# Patient Record
Sex: FEMALE | Race: WHITE | NOT HISPANIC OR LATINO | Employment: FULL TIME | URBAN - METROPOLITAN AREA
[De-identification: names, ages, dates, MRNs, and addresses within clinical notes are randomized per-mention and may not be internally consistent; named-entity substitution may affect disease eponyms.]

---

## 2020-01-08 ENCOUNTER — OFFICE VISIT (OUTPATIENT)
Dept: OPTOMETRY | Facility: CLINIC | Age: 30
End: 2020-01-08
Payer: COMMERCIAL

## 2020-01-08 DIAGNOSIS — Z01.00 EXAMINATION OF EYES AND VISION: Primary | ICD-10-CM

## 2020-01-08 DIAGNOSIS — Z46.0 CONTACT LENS/GLASSES FITTING: Primary | ICD-10-CM

## 2020-01-08 DIAGNOSIS — H52.13 MYOPIA, BILATERAL: ICD-10-CM

## 2020-01-08 DIAGNOSIS — Z46.0 CONTACT LENS/GLASSES FITTING: ICD-10-CM

## 2020-01-08 PROCEDURE — 92310 PR CONTACT LENS FITTING (NO CHANGE): ICD-10-PCS | Mod: CSM,,, | Performed by: OPTOMETRIST

## 2020-01-08 PROCEDURE — 92015 DETERMINE REFRACTIVE STATE: CPT | Mod: S$GLB,,, | Performed by: OPTOMETRIST

## 2020-01-08 PROCEDURE — 92004 COMPRE OPH EXAM NEW PT 1/>: CPT | Mod: S$GLB,,, | Performed by: OPTOMETRIST

## 2020-01-08 PROCEDURE — 99999 PR PBB SHADOW E&M-EST. PATIENT-LVL II: ICD-10-PCS | Mod: PBBFAC,,, | Performed by: OPTOMETRIST

## 2020-01-08 PROCEDURE — 99499 UNLISTED E&M SERVICE: CPT | Mod: S$GLB,,, | Performed by: OPTOMETRIST

## 2020-01-08 PROCEDURE — 92015 PR REFRACTION: ICD-10-PCS | Mod: S$GLB,,, | Performed by: OPTOMETRIST

## 2020-01-08 PROCEDURE — 99999 PR PBB SHADOW E&M-EST. PATIENT-LVL II: CPT | Mod: PBBFAC,,, | Performed by: OPTOMETRIST

## 2020-01-08 PROCEDURE — 92004 PR EYE EXAM, NEW PATIENT,COMPREHESV: ICD-10-PCS | Mod: S$GLB,,, | Performed by: OPTOMETRIST

## 2020-01-08 PROCEDURE — 92310 CONTACT LENS FITTING OU: CPT | Mod: CSM,,, | Performed by: OPTOMETRIST

## 2020-01-08 PROCEDURE — 99499 NO LOS: ICD-10-PCS | Mod: S$GLB,,, | Performed by: OPTOMETRIST

## 2020-01-08 NOTE — PROGRESS NOTES
HPI     Routine eye exam with contacts-dle-10/9/18    Pt denies any blurred vision. Complains of trouble seeing at night. Denies   any eye pain. No flashes or floaters.     Last edited by Willa Strauss on 1/8/2020  9:21 AM. (History)        ROS     Positive for: Eyes    Negative for: Constitutional, Gastrointestinal, Neurological, Skin,   Genitourinary, Musculoskeletal, HENT, Endocrine, Cardiovascular,   Respiratory, Psychiatric, Allergic/Imm, Heme/Lymph    Last edited by GUILLE Toribio, OD on 1/8/2020  9:31 AM. (History)        Assessment /Plan     For exam results, see Encounter Report.    Examination of eyes and vision    Myopia, bilateral    Contact lens/glasses fitting      1. Ocular health exam OU   Defer DFE today due to work  2. Updated specs rx gave copy  3. Updated clrx, gave new trials with slight changes OS---optifree soln  Message if issues    DAILY WEAR CONTACT LENSES  Continue with Daily Wear of contact lenses, up to all waking hours.  Continue with approved contact lens disinfection / rewetting drops as discussed.  Dispose of lenses monthly.  Stop wearing your lenses and call our office if redness, blurred vision, or pain persists more than 12 hours.  We recommend an annual eye exam for contact lens patients.    Will return in 1 week to complete DFE    Discussed and educated patient on current findings /plan.  RTC 1 year, prn if any changes / issues

## 2020-04-21 DIAGNOSIS — Z01.84 ANTIBODY RESPONSE EXAMINATION: ICD-10-CM

## 2020-04-24 ENCOUNTER — LAB VISIT (OUTPATIENT)
Dept: LAB | Facility: HOSPITAL | Age: 30
End: 2020-04-24
Attending: INTERNAL MEDICINE
Payer: COMMERCIAL

## 2020-04-24 DIAGNOSIS — Z01.84 ANTIBODY RESPONSE EXAMINATION: ICD-10-CM

## 2020-04-24 LAB — SARS-COV-2 IGG SERPL QL IA: NEGATIVE

## 2020-04-24 PROCEDURE — 86769 SARS-COV-2 COVID-19 ANTIBODY: CPT

## 2020-04-24 PROCEDURE — 36415 COLL VENOUS BLD VENIPUNCTURE: CPT | Mod: PO

## 2020-05-12 ENCOUNTER — HOSPITAL ENCOUNTER (OUTPATIENT)
Dept: RADIOLOGY | Facility: HOSPITAL | Age: 30
Discharge: HOME | End: 2020-05-12
Attending: NURSE PRACTITIONER
Payer: COMMERCIAL

## 2020-05-12 ENCOUNTER — OFFICE VISIT (OUTPATIENT)
Dept: ORTHOPEDICS | Facility: CLINIC | Age: 30
End: 2020-05-12
Payer: COMMERCIAL

## 2020-05-12 DIAGNOSIS — M25.559 ARTHRALGIA OF HIP, UNSPECIFIED LATERALITY: Primary | ICD-10-CM

## 2020-05-12 DIAGNOSIS — M25.559 ARTHRALGIA OF HIP, UNSPECIFIED LATERALITY: ICD-10-CM

## 2020-05-12 DIAGNOSIS — M70.61 GREATER TROCHANTERIC BURSITIS OF RIGHT HIP: Primary | ICD-10-CM

## 2020-05-12 PROCEDURE — 73521 XR HIPS BILATERAL 2 VIEW INCL AP PELVIS: ICD-10-PCS | Mod: 26,,, | Performed by: RADIOLOGY

## 2020-05-12 PROCEDURE — 73521 X-RAY EXAM HIPS BI 2 VIEWS: CPT | Mod: 26,,, | Performed by: RADIOLOGY

## 2020-05-12 PROCEDURE — 99203 OFFICE O/P NEW LOW 30 MIN: CPT | Mod: S$GLB,,, | Performed by: NURSE PRACTITIONER

## 2020-05-12 PROCEDURE — 99999 PR PBB SHADOW E&M-EST. PATIENT-LVL II: ICD-10-PCS | Mod: PBBFAC,,, | Performed by: NURSE PRACTITIONER

## 2020-05-12 PROCEDURE — 73521 X-RAY EXAM HIPS BI 2 VIEWS: CPT | Mod: TC,PO

## 2020-05-12 PROCEDURE — 99203 PR OFFICE/OUTPT VISIT, NEW, LEVL III, 30-44 MIN: ICD-10-PCS | Mod: S$GLB,,, | Performed by: NURSE PRACTITIONER

## 2020-05-12 PROCEDURE — 99999 PR PBB SHADOW E&M-EST. PATIENT-LVL II: CPT | Mod: PBBFAC,,, | Performed by: NURSE PRACTITIONER

## 2020-05-12 RX ORDER — DIAZEPAM 10 MG/1
10 TABLET ORAL
Qty: 1 TABLET | Refills: 0 | Status: SHIPPED | OUTPATIENT
Start: 2020-05-12 | End: 2021-02-17

## 2020-05-12 NOTE — PROGRESS NOTES
No chief complaint on file.      HPI:   This is a 29 y.o. who presents to clinic today complaining of bilateral hip pain for the past 5 years after no known trauma. Pain is progressively worsening on the right and interrupts sleep. No numbness or tingling. No associated signs or symptoms.    No past medical history on file.  Past Surgical History:   Procedure Laterality Date    breast Bilateral 2010    reduction    BREAST SURGERY Bilateral 2006    augmentation     Current Outpatient Medications on File Prior to Visit   Medication Sig Dispense Refill    azithromycin (Z-ADRIEL) 250 MG tablet Take 2 tablets (250mg) by mouth on day 1, then 1 tablet (250mg) daily for 4 days. 6 tablet 0    baloxavir marboxiL (XOFLUZA) 20 mg tablet Take 2 tablets (40mg) as one dose. 2 tablet 0    buPROPion (WELLBUTRIN XL) 300 MG 24 hr tablet Take 1 tablet (300 mg total) by mouth once daily. 90 tablet 1    cabergoline (DOSTINEX) 0.5 mg tablet Take 1/2 tablet by mouth twice a week 13 tablet 3    clomiPHENE (CLOMID) 50 mg tablet Take one tablet by mouth on days 5 through 9 of cycle as directed 5 tablet 5    dextroamphetamine-amphetamine (ADDERALL XR) 20 MG 24 hr capsule Take 1 capsule (20 mg total) by mouth every morning. 30 capsule 0    dextroamphetamine-amphetamine (ADDERALL XR) 20 MG 24 hr capsule Take 1 capsule (20 mg total) by mouth every morning. 30 capsule 0    dextroamphetamine-amphetamine (ADDERALL XR) 20 MG 24 hr capsule Take 1 capsule (20 mg total) by mouth every morning. 30 capsule 0    dextroamphetamine-amphetamine (ADDERALL XR) 20 MG 24 hr capsule Take 1 capsule (20 mg total) by mouth every morning. 30 capsule 0    hydrOXYzine pamoate (VISTARIL) 25 MG Cap Take 1 capsule (25 mg total) by mouth at bedtime nightly as needed (sleep) 30 capsule 3    medroxyPROGESTERone (PROVERA) 10 MG tablet Take 1 tablet (10 mg total) by mouth once daily. 10 tablet 0    methylPREDNISolone (MEDROL DOSEPACK) 4 mg tablet follow package  directions 21 tablet 0    norgestimate-ethinyl estradiol (ORTHO TRI-CYCLEN,TRI-SPRINTEC) 0.18/0.215/0.25 mg-35 mcg (28) tablet Take 1 tablet by mouth once daily. 28 tablet 11    norgestimate-ethinyl estradiol (SPRINTEC, 28,) 0.25-35 mg-mcg per tablet Take 1 tablet by mouth once daily. 28 tablet 12    PRENATAL VIT/IRON FUMARATE/FA (PRENATAL 1+1 ORAL) Take 1 tablet by mouth once daily.      progesterone (PROMETRIUM) 100 MG capsule Take one capsule by mouth three times daily post ovulation on days 14 though 28 of cycle as directed 42 capsule 5     No current facility-administered medications on file prior to visit.      Review of patient's allergies indicates:  No Known Allergies  Family History   Problem Relation Age of Onset    Hyperlipidemia Father     Hypertension Father     Cancer Maternal Grandmother     Cancer Paternal Grandfather     Glaucoma Neg Hx      Social History     Socioeconomic History    Marital status:      Spouse name: Not on file    Number of children: Not on file    Years of education: Not on file    Highest education level: Not on file   Occupational History    Not on file   Social Needs    Financial resource strain: Not on file    Food insecurity:     Worry: Not on file     Inability: Not on file    Transportation needs:     Medical: Not on file     Non-medical: Not on file   Tobacco Use    Smoking status: Never Smoker   Substance and Sexual Activity    Alcohol use: No    Drug use: No    Sexual activity: Yes   Lifestyle    Physical activity:     Days per week: Not on file     Minutes per session: Not on file    Stress: Not on file   Relationships    Social connections:     Talks on phone: Not on file     Gets together: Not on file     Attends [employer] service: Not on file     Active member of club or organization: Not on file     Attends meetings of clubs or organizations: Not on file     Relationship status: Not on file   Other Topics Concern    Not on file    Social History Narrative    Not on file       Review of Systems:  Constitutional:  Denies fever or chills   Eyes:  Denies change in visual acuity   HENT:  Denies nasal congestion or sore throat   Respiratory:  Denies cough or shortness of breath   Cardiovascular:  Denies chest pain or edema   GI:  Denies abdominal pain, nausea, vomiting, bloody stools or diarrhea   :  Denies dysuria   Integument:  Denies rash   Neurologic:  Denies headache, focal weakness or sensory changes   Endocrine:  Denies polyuria or polydipsia   Lymphatic:  Denies swollen glands   Psychiatric:  Denies depression or anxiety     Physical Exam:   Constitutional:  Well developed, well nourished, no acute distress, non-toxic appearance   Integument:  Well hydrated, no rash   Lymphatic:  No lymphadenopathy noted   Neurologic:  Alert & oriented x 3  Psychiatric:  Speech and behavior appropriate   Eyes: EOMI  Gi: abdomen soft    Bilateral Hip Exam       Right Hip Exam     Tenderness   The patient is experiencing tenderness over the greater trochanter and piriformis    Range of Motion   The patient has increased pain with internal rotation right hip.    Muscle Strength   Flexion: 4/5     Other   Erythema: absent  Sensation: normal  Pulse: present    left Hip Exam   Hip exam performed same as contralateral side and is normal.    X-rays were performed today, personally reviewed by me and findings discussed with the patient.  4 views of the bilateral hips no fracture or dislocation    Greater trochanteric bursitis of right hip  -     MRI Hip Without Contrast Right; Future; Expected date: 05/12/2020    Other orders  -     diazePAM (VALIUM) 10 MG Tab; Take 1 tablet (10 mg total) by mouth On call Procedure (30 minutes prior to MRI).  Dispense: 1 tablet; Refill: 0        MRI right hip ordered.  Will call with results.

## 2020-05-14 RX ORDER — MELOXICAM 15 MG/1
15 TABLET ORAL DAILY
Qty: 30 TABLET | Refills: 11 | Status: SHIPPED | OUTPATIENT
Start: 2020-05-14 | End: 2022-08-31

## 2020-12-22 ENCOUNTER — IMMUNIZATION (OUTPATIENT)
Dept: FAMILY MEDICINE | Facility: CLINIC | Age: 30
End: 2020-12-22
Payer: COMMERCIAL

## 2020-12-22 DIAGNOSIS — Z23 NEED FOR VACCINATION: ICD-10-CM

## 2020-12-22 PROCEDURE — 91300 COVID-19, MRNA, LNP-S, PF, 30 MCG/0.3 ML DOSE VACCINE: ICD-10-PCS | Mod: ,,, | Performed by: FAMILY MEDICINE

## 2020-12-22 PROCEDURE — 0001A COVID-19, MRNA, LNP-S, PF, 30 MCG/0.3 ML DOSE VACCINE: CPT | Mod: CV19,,, | Performed by: FAMILY MEDICINE

## 2020-12-22 PROCEDURE — 91300 COVID-19, MRNA, LNP-S, PF, 30 MCG/0.3 ML DOSE VACCINE: CPT | Mod: ,,, | Performed by: FAMILY MEDICINE

## 2020-12-22 PROCEDURE — 0001A COVID-19, MRNA, LNP-S, PF, 30 MCG/0.3 ML DOSE VACCINE: ICD-10-PCS | Mod: CV19,,, | Performed by: FAMILY MEDICINE

## 2021-01-12 ENCOUNTER — IMMUNIZATION (OUTPATIENT)
Dept: FAMILY MEDICINE | Facility: CLINIC | Age: 31
End: 2021-01-12
Payer: COMMERCIAL

## 2021-01-12 DIAGNOSIS — Z23 NEED FOR VACCINATION: ICD-10-CM

## 2021-01-12 PROCEDURE — 0002A COVID-19, MRNA, LNP-S, PF, 30 MCG/0.3 ML DOSE VACCINE: CPT | Mod: PBBFAC | Performed by: FAMILY MEDICINE

## 2021-01-12 PROCEDURE — 91300 COVID-19, MRNA, LNP-S, PF, 30 MCG/0.3 ML DOSE VACCINE: CPT | Mod: PBBFAC | Performed by: FAMILY MEDICINE

## 2021-02-17 ENCOUNTER — OFFICE VISIT (OUTPATIENT)
Dept: OPTOMETRY | Facility: CLINIC | Age: 31
End: 2021-02-17
Payer: COMMERCIAL

## 2021-02-17 DIAGNOSIS — Z46.0 CONTACT LENS/GLASSES FITTING: Primary | ICD-10-CM

## 2021-02-17 DIAGNOSIS — Z46.0 CONTACT LENS/GLASSES FITTING: ICD-10-CM

## 2021-02-17 DIAGNOSIS — H52.13 MYOPIA, BILATERAL: ICD-10-CM

## 2021-02-17 DIAGNOSIS — Z01.00 EXAMINATION OF EYES AND VISION: Primary | ICD-10-CM

## 2021-02-17 PROCEDURE — 99499 NO LOS: ICD-10-PCS | Mod: S$GLB,,, | Performed by: OPTOMETRIST

## 2021-02-17 PROCEDURE — 1126F PR PAIN SEVERITY QUANTIFIED, NO PAIN PRESENT: ICD-10-PCS | Mod: S$GLB,,, | Performed by: OPTOMETRIST

## 2021-02-17 PROCEDURE — 92014 COMPRE OPH EXAM EST PT 1/>: CPT | Mod: S$GLB,,, | Performed by: OPTOMETRIST

## 2021-02-17 PROCEDURE — 92310 CONTACT LENS FITTING OU: CPT | Mod: CSM,S$GLB,, | Performed by: OPTOMETRIST

## 2021-02-17 PROCEDURE — 92015 DETERMINE REFRACTIVE STATE: CPT | Mod: S$GLB,,, | Performed by: OPTOMETRIST

## 2021-02-17 PROCEDURE — 92015 PR REFRACTION: ICD-10-PCS | Mod: S$GLB,,, | Performed by: OPTOMETRIST

## 2021-02-17 PROCEDURE — 99999 PR PBB SHADOW E&M-EST. PATIENT-LVL III: CPT | Mod: PBBFAC,,, | Performed by: OPTOMETRIST

## 2021-02-17 PROCEDURE — 99499 UNLISTED E&M SERVICE: CPT | Mod: S$GLB,,, | Performed by: OPTOMETRIST

## 2021-02-17 PROCEDURE — 92014 PR EYE EXAM, EST PATIENT,COMPREHESV: ICD-10-PCS | Mod: S$GLB,,, | Performed by: OPTOMETRIST

## 2021-02-17 PROCEDURE — 92310 PR CONTACT LENS FITTING (NO CHANGE): ICD-10-PCS | Mod: CSM,S$GLB,, | Performed by: OPTOMETRIST

## 2021-02-17 PROCEDURE — 1126F AMNT PAIN NOTED NONE PRSNT: CPT | Mod: S$GLB,,, | Performed by: OPTOMETRIST

## 2021-02-17 PROCEDURE — 99999 PR PBB SHADOW E&M-EST. PATIENT-LVL III: ICD-10-PCS | Mod: PBBFAC,,, | Performed by: OPTOMETRIST

## 2021-07-13 ENCOUNTER — PATIENT MESSAGE (OUTPATIENT)
Dept: DERMATOLOGY | Facility: CLINIC | Age: 31
End: 2021-07-13

## 2021-07-15 ENCOUNTER — OFFICE VISIT (OUTPATIENT)
Dept: DERMATOLOGY | Facility: CLINIC | Age: 31
End: 2021-07-15
Payer: COMMERCIAL

## 2021-07-15 DIAGNOSIS — L71.0 PERIORIFICIAL DERMATITIS: Primary | ICD-10-CM

## 2021-07-15 PROCEDURE — 99203 PR OFFICE/OUTPT VISIT, NEW, LEVL III, 30-44 MIN: ICD-10-PCS | Mod: 95,,, | Performed by: DERMATOLOGY

## 2021-07-15 PROCEDURE — 99203 OFFICE O/P NEW LOW 30 MIN: CPT | Mod: 95,,, | Performed by: DERMATOLOGY

## 2021-07-15 RX ORDER — DOXYCYCLINE 100 MG/1
100 TABLET ORAL DAILY
Qty: 30 TABLET | Refills: 1 | Status: SHIPPED | OUTPATIENT
Start: 2021-07-15 | End: 2021-12-21 | Stop reason: ALTCHOICE

## 2021-07-15 RX ORDER — METRONIDAZOLE 7.5 MG/G
CREAM TOPICAL 2 TIMES DAILY
Qty: 45 G | Refills: 2 | Status: SHIPPED | OUTPATIENT
Start: 2021-07-15 | End: 2022-08-31

## 2021-09-15 ENCOUNTER — OFFICE VISIT (OUTPATIENT)
Dept: DERMATOLOGY | Facility: CLINIC | Age: 31
End: 2021-09-15
Payer: COMMERCIAL

## 2021-09-15 VITALS — HEIGHT: 65 IN | RESPIRATION RATE: 18 BRPM | BODY MASS INDEX: 35.16 KG/M2 | WEIGHT: 211 LBS
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
## 2021-09-15 DIAGNOSIS — S00.86XA INSECT BITE OF FACE WITH LOCAL REACTION, INITIAL ENCOUNTER: Primary | ICD-10-CM

## 2021-09-15 PROCEDURE — 1159F MED LIST DOCD IN RCRD: CPT | Mod: CPTII,S$GLB,, | Performed by: DERMATOLOGY

## 2021-09-15 PROCEDURE — 99999 PR PBB SHADOW E&M-EST. PATIENT-LVL III: ICD-10-PCS | Mod: PBBFAC,,, | Performed by: DERMATOLOGY

## 2021-09-15 PROCEDURE — 99213 PR OFFICE/OUTPT VISIT, EST, LEVL III, 20-29 MIN: ICD-10-PCS | Mod: S$GLB,,, | Performed by: DERMATOLOGY

## 2021-09-15 PROCEDURE — 99999 PR PBB SHADOW E&M-EST. PATIENT-LVL III: CPT | Mod: PBBFAC,,, | Performed by: DERMATOLOGY

## 2021-09-15 PROCEDURE — 3008F PR BODY MASS INDEX (BMI) DOCUMENTED: ICD-10-PCS | Mod: CPTII,S$GLB,, | Performed by: DERMATOLOGY

## 2021-09-15 PROCEDURE — 99213 OFFICE O/P EST LOW 20 MIN: CPT | Mod: S$GLB,,, | Performed by: DERMATOLOGY

## 2021-09-15 PROCEDURE — 1159F PR MEDICATION LIST DOCUMENTED IN MEDICAL RECORD: ICD-10-PCS | Mod: CPTII,S$GLB,, | Performed by: DERMATOLOGY

## 2021-09-15 PROCEDURE — 3008F BODY MASS INDEX DOCD: CPT | Mod: CPTII,S$GLB,, | Performed by: DERMATOLOGY

## 2021-09-15 RX ORDER — DOXYCYCLINE 100 MG/1
100 TABLET ORAL 2 TIMES DAILY
Qty: 14 TABLET | Refills: 1 | Status: SHIPPED | OUTPATIENT
Start: 2021-09-15 | End: 2022-02-24

## 2021-09-15 RX ORDER — PREDNISONE 20 MG/1
TABLET ORAL
Qty: 20 TABLET | Refills: 0 | Status: SHIPPED | OUTPATIENT
Start: 2021-09-15 | End: 2021-09-27

## 2021-09-17 ENCOUNTER — PATIENT MESSAGE (OUTPATIENT)
Dept: DERMATOLOGY | Facility: CLINIC | Age: 31
End: 2021-09-17

## 2021-09-20 ENCOUNTER — TELEPHONE (OUTPATIENT)
Dept: ADMINISTRATIVE | Facility: HOSPITAL | Age: 31
End: 2021-09-20

## 2021-09-20 ENCOUNTER — OCCUPATIONAL HEALTH (OUTPATIENT)
Dept: URGENT CARE | Facility: CLINIC | Age: 31
End: 2021-09-20

## 2021-09-20 ENCOUNTER — PATIENT MESSAGE (OUTPATIENT)
Dept: PRIMARY CARE CLINIC | Facility: CLINIC | Age: 31
End: 2021-09-20

## 2021-09-20 DIAGNOSIS — R05.9 COUGH: Primary | ICD-10-CM

## 2021-09-20 DIAGNOSIS — R05.9 COUGH: ICD-10-CM

## 2021-09-20 LAB
CTP QC/QA: YES
SARS-COV-2 RDRP RESP QL NAA+PROBE: POSITIVE

## 2021-09-20 PROCEDURE — U0002 COVID-19 LAB TEST NON-CDC: HCPCS | Mod: QW,S$GLB,, | Performed by: PREVENTIVE MEDICINE

## 2021-09-20 PROCEDURE — U0002: ICD-10-PCS | Mod: QW,S$GLB,, | Performed by: PREVENTIVE MEDICINE

## 2021-11-11 ENCOUNTER — PATIENT MESSAGE (OUTPATIENT)
Dept: ADMINISTRATIVE | Facility: OTHER | Age: 31
End: 2021-11-11
Payer: COMMERCIAL

## 2021-12-20 RX ORDER — DOXYCYCLINE 100 MG/1
100 TABLET ORAL 2 TIMES DAILY
Qty: 20 TABLET | Refills: 0 | Status: SHIPPED | OUTPATIENT
Start: 2021-12-20 | End: 2021-12-30

## 2021-12-21 DIAGNOSIS — L03.211 CELLULITIS OF FACE: Primary | ICD-10-CM

## 2021-12-21 RX ORDER — LINEZOLID 600 MG/1
600 TABLET, FILM COATED ORAL EVERY 12 HOURS
Qty: 14 TABLET | Refills: 0 | Status: SHIPPED | OUTPATIENT
Start: 2021-12-21 | End: 2021-12-28

## 2021-12-21 RX ORDER — CHLORHEXIDINE GLUCONATE 40 MG/ML
SOLUTION TOPICAL 2 TIMES DAILY
Qty: 236 ML | Refills: 0 | COMMUNITY
Start: 2021-12-21 | End: 2022-08-31 | Stop reason: ALTCHOICE

## 2021-12-21 RX ORDER — MUPIROCIN 20 MG/G
OINTMENT TOPICAL 3 TIMES DAILY
Qty: 15 G | Refills: 3 | Status: SHIPPED | OUTPATIENT
Start: 2021-12-21 | End: 2022-07-19 | Stop reason: SDUPTHER

## 2021-12-22 ENCOUNTER — PATIENT MESSAGE (OUTPATIENT)
Dept: ORTHOPEDICS | Facility: CLINIC | Age: 31
End: 2021-12-22
Payer: COMMERCIAL

## 2021-12-22 DIAGNOSIS — R51.9 FACE PAIN: ICD-10-CM

## 2021-12-22 DIAGNOSIS — R59.0 LOCALIZED ENLARGED LYMPH NODES: ICD-10-CM

## 2021-12-22 DIAGNOSIS — J35.9 CHRONIC DISEASE OF TONSILS AND ADENOIDS, UNSPECIFIED: ICD-10-CM

## 2021-12-22 DIAGNOSIS — M26.69 OTHER SPECIFIED DISORDERS OF TEMPOROMANDIBULAR JOINT: ICD-10-CM

## 2022-01-06 ENCOUNTER — IMMUNIZATION (OUTPATIENT)
Dept: FAMILY MEDICINE | Facility: CLINIC | Age: 32
End: 2022-01-06
Payer: COMMERCIAL

## 2022-01-06 DIAGNOSIS — Z23 NEED FOR VACCINATION: Primary | ICD-10-CM

## 2022-01-06 PROCEDURE — 0004A COVID-19, MRNA, LNP-S, PF, 30 MCG/0.3 ML DOSE VACCINE: CPT | Mod: PBBFAC | Performed by: FAMILY MEDICINE

## 2022-01-11 ENCOUNTER — LAB VISIT (OUTPATIENT)
Dept: LAB | Facility: HOSPITAL | Age: 32
End: 2022-01-11
Attending: NURSE PRACTITIONER
Payer: COMMERCIAL

## 2022-01-11 DIAGNOSIS — Z01.89 LABORATORY PROCEDURE: ICD-10-CM

## 2022-01-11 DIAGNOSIS — E78.01 ESSENTIAL FAMILIAL HYPERCHOLESTEROLEMIA: ICD-10-CM

## 2022-01-11 DIAGNOSIS — E27.0 OVERPRODUCTION OF ACTH: ICD-10-CM

## 2022-01-11 DIAGNOSIS — R63.5 ABNORMAL WEIGHT GAIN: Primary | ICD-10-CM

## 2022-01-11 LAB
CHOLEST SERPL-MCNC: 183 MG/DL (ref 120–199)
CHOLEST/HDLC SERPL: 2.4 {RATIO} (ref 2–5)
CORTIS SERPL-MCNC: 2.6 UG/DL
HDLC SERPL-MCNC: 77 MG/DL (ref 40–75)
HDLC SERPL: 42.1 % (ref 20–50)
LDLC SERPL CALC-MCNC: 94.2 MG/DL (ref 63–159)
NONHDLC SERPL-MCNC: 106 MG/DL
TRIGL SERPL-MCNC: 59 MG/DL (ref 30–150)

## 2022-01-11 PROCEDURE — 36415 COLL VENOUS BLD VENIPUNCTURE: CPT | Mod: PO | Performed by: NURSE PRACTITIONER

## 2022-01-11 PROCEDURE — 80061 LIPID PANEL: CPT | Performed by: NURSE PRACTITIONER

## 2022-01-11 PROCEDURE — 82533 TOTAL CORTISOL: CPT | Performed by: NURSE PRACTITIONER

## 2022-02-23 ENCOUNTER — TELEPHONE (OUTPATIENT)
Dept: OPTOMETRY | Facility: CLINIC | Age: 32
End: 2022-02-23
Payer: COMMERCIAL

## 2022-03-23 RX ORDER — CIPROFLOXACIN 500 MG/1
500 TABLET ORAL EVERY 12 HOURS
Qty: 20 TABLET | Refills: 0 | Status: SHIPPED | OUTPATIENT
Start: 2022-03-23 | End: 2022-04-02

## 2022-04-06 ENCOUNTER — OFFICE VISIT (OUTPATIENT)
Dept: OPTOMETRY | Facility: CLINIC | Age: 32
End: 2022-04-06
Payer: COMMERCIAL

## 2022-04-06 DIAGNOSIS — Z01.00 EXAMINATION OF EYES AND VISION: Primary | ICD-10-CM

## 2022-04-06 DIAGNOSIS — H52.13 MYOPIA, BILATERAL: ICD-10-CM

## 2022-04-06 DIAGNOSIS — Z46.0 CONTACT LENS/GLASSES FITTING: Primary | ICD-10-CM

## 2022-04-06 DIAGNOSIS — Z46.0 CONTACT LENS/GLASSES FITTING: ICD-10-CM

## 2022-04-06 PROCEDURE — 99999 PR PBB SHADOW E&M-EST. PATIENT-LVL III: CPT | Mod: PBBFAC,,, | Performed by: OPTOMETRIST

## 2022-04-06 PROCEDURE — 92015 DETERMINE REFRACTIVE STATE: CPT | Mod: S$GLB,,, | Performed by: OPTOMETRIST

## 2022-04-06 PROCEDURE — 92012 PR EYE EXAM, EST PATIENT,INTERMED: ICD-10-PCS | Mod: S$GLB,,, | Performed by: OPTOMETRIST

## 2022-04-06 PROCEDURE — 92310 CONTACT LENS FITTING OU: CPT | Mod: CSM,S$GLB,, | Performed by: OPTOMETRIST

## 2022-04-06 PROCEDURE — 1159F PR MEDICATION LIST DOCUMENTED IN MEDICAL RECORD: ICD-10-PCS | Mod: CPTII,S$GLB,, | Performed by: OPTOMETRIST

## 2022-04-06 PROCEDURE — 92015 PR REFRACTION: ICD-10-PCS | Mod: S$GLB,,, | Performed by: OPTOMETRIST

## 2022-04-06 PROCEDURE — 99499 UNLISTED E&M SERVICE: CPT | Mod: S$GLB,,, | Performed by: OPTOMETRIST

## 2022-04-06 PROCEDURE — 92310 PR CONTACT LENS FITTING (NO CHANGE): ICD-10-PCS | Mod: CSM,S$GLB,, | Performed by: OPTOMETRIST

## 2022-04-06 PROCEDURE — 99999 PR PBB SHADOW E&M-EST. PATIENT-LVL III: ICD-10-PCS | Mod: PBBFAC,,, | Performed by: OPTOMETRIST

## 2022-04-06 PROCEDURE — 1159F MED LIST DOCD IN RCRD: CPT | Mod: CPTII,S$GLB,, | Performed by: OPTOMETRIST

## 2022-04-06 PROCEDURE — 99499 NO LOS: ICD-10-PCS | Mod: S$GLB,,, | Performed by: OPTOMETRIST

## 2022-04-06 PROCEDURE — 92012 INTRM OPH EXAM EST PATIENT: CPT | Mod: S$GLB,,, | Performed by: OPTOMETRIST

## 2022-04-06 NOTE — PROGRESS NOTES
HPI     Annual eye exam with CL fit    Pt. States no changes in VA that she has noticed.  Denies use of gtts, flashes, or floaters.  States she tries not to sleep in CL and disposes of them every 2-3 wks    Last edited by Keesha Wilburn on 4/6/2022  8:38 AM. (History)        ROS     Positive for: Eyes    Negative for: Constitutional, Gastrointestinal, Neurological, Skin,   Genitourinary, Musculoskeletal, HENT, Endocrine, Cardiovascular,   Respiratory, Psychiatric, Allergic/Imm, Heme/Lymph    Last edited by GUILLE Toribio, OD on 4/6/2022  8:54 AM. (History)        Assessment /Plan     For exam results, see Encounter Report.    Examination of eyes and vision    Myopia, bilateral    Contact lens/glasses fitting      1. Ocular health exam OU  Pt defers IOP/ DFE due to work    2. Updated specs rx, gave copy--stable   3. Updated clrx, gave copy--no chnages    DAILY WEAR CONTACT LENSES  Continue with Daily Wear of contact lenses, up to all waking hours.  Continue with approved contact lens disinfection / rewetting drops as discussed.  Dispose of lenses monthly.  Stop wearing your lenses and call our office if redness, blurred vision, or pain persists more than 12 hours.  We recommend an annual eye exam for contact lens patients.      Discussed and educated patient on current findings /plan.  RTC 1 year, prn if any changes / issues

## 2022-04-06 NOTE — PATIENT INSTRUCTIONS
"DRY EYES -- BURNING OR KELIN SYMPTOMS:  Use Over The Counter artificial tears as needed for dry eye symptoms.   Some common brands include:  Systane, Optive, Refresh, and Thera-Tears.  These drops can be used as frequently as desired, but may be most helpful use during long periods of concentrated work.  For example, reading / working at the computer. Start with 3-4x per day.     Nighttime Ophthalmic gel or ointments are available: Refresh PM, Genteal, and Lacrilube.    Avoid drops that "get redness out" (Visine, Murine, Clear Eyes), as these may contain medication that could further irritate the eyes, especially with chronic use.    ALLERGY EYES -- ITCHING SYMPTOMS:  Over the counter medications include--Pataday, Zaditor, and Alaway.  Use as directed 1-2 drops daily for symptoms of itching / watering eyes.  These drops will not help for dry eye or exposure symptoms.    REDNESS RELIEF:  Lumify---is a good redness reliever that will not cause irritation if used chronically.        DAILY WEAR CONTACT LENSES  Continue with Daily Wear of contact lenses, up to all waking hours.  Continue with approved contact lens disinfection / rewetting drops as discussed.  Dispose of lenses monthly.  Stop wearing your lenses and call our office if redness, blurred vision, or pain persists more than 12 hours.  We recommend an annual eye exam for contact lens patients.     "

## 2022-04-28 DIAGNOSIS — M79.89 MASS OF DEEP SOFT TISSUE OF GROIN: Primary | ICD-10-CM

## 2022-04-29 DIAGNOSIS — M79.89 MASS OF DEEP SOFT TISSUE OF GROIN: Primary | ICD-10-CM

## 2022-05-03 RX ORDER — SEMAGLUTIDE 1.34 MG/ML
INJECTION, SOLUTION SUBCUTANEOUS
Qty: 5 PEN | Refills: 0 | Status: SHIPPED | OUTPATIENT
Start: 2022-05-03 | End: 2022-08-01

## 2022-05-05 ENCOUNTER — OFFICE VISIT (OUTPATIENT)
Dept: ORTHOPEDICS | Facility: CLINIC | Age: 32
End: 2022-05-05
Payer: COMMERCIAL

## 2022-05-05 DIAGNOSIS — M79.89 MASS OF DEEP SOFT TISSUE OF GROIN: Primary | ICD-10-CM

## 2022-05-05 DIAGNOSIS — M70.62 GREATER TROCHANTERIC BURSITIS OF LEFT HIP: ICD-10-CM

## 2022-05-05 DIAGNOSIS — M70.61 GREATER TROCHANTERIC BURSITIS OF RIGHT HIP: ICD-10-CM

## 2022-05-05 PROCEDURE — 99213 OFFICE O/P EST LOW 20 MIN: CPT | Mod: S$GLB,,, | Performed by: ORTHOPAEDIC SURGERY

## 2022-05-05 PROCEDURE — 99213 PR OFFICE/OUTPT VISIT, EST, LEVL III, 20-29 MIN: ICD-10-PCS | Mod: S$GLB,,, | Performed by: ORTHOPAEDIC SURGERY

## 2022-05-05 PROCEDURE — 1160F PR REVIEW ALL MEDS BY PRESCRIBER/CLIN PHARMACIST DOCUMENTED: ICD-10-PCS | Mod: CPTII,S$GLB,, | Performed by: ORTHOPAEDIC SURGERY

## 2022-05-05 PROCEDURE — 1159F PR MEDICATION LIST DOCUMENTED IN MEDICAL RECORD: ICD-10-PCS | Mod: CPTII,S$GLB,, | Performed by: ORTHOPAEDIC SURGERY

## 2022-05-05 PROCEDURE — 1160F RVW MEDS BY RX/DR IN RCRD: CPT | Mod: CPTII,S$GLB,, | Performed by: ORTHOPAEDIC SURGERY

## 2022-05-05 PROCEDURE — 1159F MED LIST DOCD IN RCRD: CPT | Mod: CPTII,S$GLB,, | Performed by: ORTHOPAEDIC SURGERY

## 2022-05-05 PROCEDURE — 99999 PR PBB SHADOW E&M-EST. PATIENT-LVL II: CPT | Mod: PBBFAC,,, | Performed by: ORTHOPAEDIC SURGERY

## 2022-05-05 PROCEDURE — 99999 PR PBB SHADOW E&M-EST. PATIENT-LVL II: ICD-10-PCS | Mod: PBBFAC,,, | Performed by: ORTHOPAEDIC SURGERY

## 2022-05-05 RX ORDER — ALPRAZOLAM 0.5 MG/1
0.5 TABLET ORAL 3 TIMES DAILY PRN
Qty: 44 TABLET | Refills: 2 | Status: SHIPPED | OUTPATIENT
Start: 2022-05-05 | End: 2022-11-22 | Stop reason: SDUPTHER

## 2022-05-05 RX ORDER — METHOCARBAMOL 750 MG/1
750 TABLET, FILM COATED ORAL 4 TIMES DAILY PRN
Qty: 90 TABLET | Refills: 3 | Status: ON HOLD | OUTPATIENT
Start: 2022-05-05 | End: 2022-09-02 | Stop reason: HOSPADM

## 2022-05-05 RX ORDER — TRAMADOL HYDROCHLORIDE 50 MG/1
50 TABLET ORAL EVERY 4 HOURS PRN
Qty: 44 TABLET | Refills: 0 | Status: ON HOLD | OUTPATIENT
Start: 2022-05-05 | End: 2022-09-02 | Stop reason: HOSPADM

## 2022-05-05 NOTE — PROGRESS NOTES
No chief complaint on file.     HPI:   This is a 31 y.o. who presents to clinic today for bilateral hip pain for the past 4 years after no known trauma. Complains of pain while sleeping on side and when descending stairs. Pain is constant. No numbness or tingling. No associated signs or symptoms.      History reviewed. No pertinent past medical history.  Past Surgical History:   Procedure Laterality Date    breast Bilateral 2010    reduction    BREAST SURGERY Bilateral 2006    augmentation     Current Outpatient Medications on File Prior to Visit   Medication Sig Dispense Refill    chlorhexidine (HIBICLENS) 4 % external liquid Apply topically 2 (two) times daily. Use to gently cleanse area twice daily. (Patient not taking: Reported on 4/6/2022) 236 mL 0    dextroamphetamine-amphetamine (ADDERALL XR) 20 MG 24 hr capsule Take 1 capsule (20 mg total) by mouth every morning. (Patient not taking: Reported on 4/6/2022) 30 capsule 0    dextroamphetamine-amphetamine (ADDERALL XR) 20 MG 24 hr capsule Take 1 capsule (20 mg total) by mouth every morning. 30 capsule 0    dextroamphetamine-amphetamine (ADDERALL XR) 20 MG 24 hr capsule Take 1 capsule (20 mg total) by mouth every morning. (Patient not taking: Reported on 4/6/2022) 30 capsule 0    dextroamphetamine-amphetamine (ADDERALL XR) 20 MG 24 hr capsule Take 1 capsule (20 mg total) by mouth every morning. (Patient not taking: Reported on 4/6/2022) 30 capsule 0    dextroamphetamine-amphetamine (ADDERALL XR) 20 MG 24 hr capsule Take 1 capsule (20 mg total) by mouth every morning. (Patient not taking: Reported on 4/6/2022) 30 capsule 0    ELDERBERRY FRUIT ORAL Take by mouth.      hydrOXYzine pamoate (VISTARIL) 25 MG Cap Take 1 capsule (25 mg total) by mouth at bedtime nightly as needed (sleep) (Patient not taking: Reported on 4/6/2022) 30 capsule 3    levonorgest-eth.estradioL-iron (BALCOLTRA) 0.1 mg-0.02 mg (21)/36.5 mg(7) Tab Take 1 tablet by mouth once daily.  (Patient not taking: Reported on 4/6/2022) 28 tablet 10    levonorgest-eth.estradioL-iron (BALCOLTRA) 0.1 mg-0.02 mg (21)/36.5 mg(7) Tab Take 1 tablet by mouth daily (Patient not taking: Reported on 4/6/2022) 28 tablet 3    medroxyPROGESTERone (PROVERA) 10 MG tablet Take 1 tablet (10 mg total) by mouth once daily. (Patient not taking: Reported on 4/6/2022) 10 tablet 0    meloxicam (MOBIC) 15 MG tablet Take 1 tablet (15 mg total) by mouth once daily. (Patient not taking: Reported on 4/6/2022) 30 tablet 11    metronidazole 0.75% (METROCREAM) 0.75 % Crea Apply topically 2 (two) times daily. (Patient not taking: Reported on 4/6/2022) 45 g 2    mupirocin (BACTROBAN) 2 % ointment Apply topically 3 (three) times daily. (Patient not taking: Reported on 4/6/2022) 15 g 3    norgestimate-ethinyl estradiol (SPRINTEC, 28,) 0.25-35 mg-mcg per tablet Take 1 tablet by mouth once daily. (Patient not taking: Reported on 4/6/2022) 28 tablet 12    ondansetron (ZOFRAN-ODT) 8 MG TbDL Dissolve 1 tablet (8 mg total) on or under the tongue every 6 (six) hours as needed for nausea. 30 tablet 3    progesterone (PROMETRIUM) 200 MG capsule Take one capsule by mouth nightly 10 capsule 9    semaglutide (OZEMPIC) 0.25 mg or 0.5 mg(2 mg/1.5 mL) pen injector Inject 0.25 mg into the skin every 7 days for 14 days, THEN 0.5 mg every 7 days. 5 pen 0     Current Facility-Administered Medications on File Prior to Visit   Medication Dose Route Frequency Provider Last Rate Last Admin    diphenhydrAMINE capsule 25 mg  25 mg Oral Once PRN James Pires MD        EPINEPHrine (EPIPEN) 0.3 mg/0.3 mL pen injection 0.3 mg  0.3 mg Intramuscular PRN James Pires MD         Review of patient's allergies indicates:  No Known Allergies  Family History   Problem Relation Age of Onset    Hyperlipidemia Father     Hypertension Father     Cancer Maternal Grandmother     Cancer Paternal Grandfather     Glaucoma Neg Hx     Cataracts Neg Hx      Macular degeneration Neg Hx     Retinal detachment Neg Hx     Amblyopia Neg Hx     Blindness Neg Hx     Strabismus Neg Hx      Social History     Socioeconomic History    Marital status:    Tobacco Use    Smoking status: Never Smoker    Smokeless tobacco: Never Used   Substance and Sexual Activity    Alcohol use: No    Drug use: No    Sexual activity: Yes       Review of Systems:  Constitutional:  Denies fever or chills   Eyes:  Denies change in visual acuity   HENT:  Denies nasal congestion or sore throat   Respiratory:  Denies cough or shortness of breath   Cardiovascular:  Denies chest pain or edema   GI:  Denies abdominal pain, nausea, vomiting, bloody stools or diarrhea   :  Denies dysuria   Integument:  Denies rash   Neurologic:  Denies headache, focal weakness or sensory changes   Endocrine:  Denies polyuria or polydipsia   Lymphatic:  Denies swollen glands   Psychiatric:  Denies depression or anxiety     Physical Exam:   Constitutional:  Well developed, well nourished, no acute distress, non-toxic appearance   Integument:  Well hydrated, no rash   Lymphatic:  No lymphadenopathy noted   Neurologic:  Alert & oriented x 3  Psychiatric:  Speech and behavior appropriate     Bilateral Hip Exam  Tenderness   The patient is experiencing tenderness in the greater trochanter and piriformis.  7x8cm mass noted to anterolateral proximal thigh    Range of Motion   The patient has normal hip ROM.  Muscle Strength   Abduction: 4/5   Other   Erythema: absent  Sensation: normal  Pulse: present    X-rays were personally reviewed by me and findings discussed with the patient.  2 views of the bilateral hip show no fractures or dislocations    Mass of deep soft tissue of groin    Greater trochanteric bursitis of right hip    Greater trochanteric bursitis of left hip         Will order MRI to evaluate the new mass as well as severe bursitis no longer relieved with injections.

## 2022-05-24 ENCOUNTER — OFFICE VISIT (OUTPATIENT)
Dept: ORTHOPEDICS | Facility: CLINIC | Age: 32
End: 2022-05-24
Payer: COMMERCIAL

## 2022-05-24 DIAGNOSIS — M70.62 GREATER TROCHANTERIC BURSITIS OF LEFT HIP: ICD-10-CM

## 2022-05-24 DIAGNOSIS — M70.61 GREATER TROCHANTERIC BURSITIS OF RIGHT HIP: Primary | ICD-10-CM

## 2022-05-24 PROCEDURE — 1159F MED LIST DOCD IN RCRD: CPT | Mod: CPTII,S$GLB,, | Performed by: ORTHOPAEDIC SURGERY

## 2022-05-24 PROCEDURE — 99999 PR PBB SHADOW E&M-EST. PATIENT-LVL III: CPT | Mod: PBBFAC,,, | Performed by: ORTHOPAEDIC SURGERY

## 2022-05-24 PROCEDURE — 99214 OFFICE O/P EST MOD 30 MIN: CPT | Mod: S$GLB,,, | Performed by: ORTHOPAEDIC SURGERY

## 2022-05-24 PROCEDURE — 99999 PR PBB SHADOW E&M-EST. PATIENT-LVL III: ICD-10-PCS | Mod: PBBFAC,,, | Performed by: ORTHOPAEDIC SURGERY

## 2022-05-24 PROCEDURE — 1159F PR MEDICATION LIST DOCUMENTED IN MEDICAL RECORD: ICD-10-PCS | Mod: CPTII,S$GLB,, | Performed by: ORTHOPAEDIC SURGERY

## 2022-05-24 PROCEDURE — 99214 PR OFFICE/OUTPT VISIT, EST, LEVL IV, 30-39 MIN: ICD-10-PCS | Mod: S$GLB,,, | Performed by: ORTHOPAEDIC SURGERY

## 2022-05-24 PROCEDURE — 1160F PR REVIEW ALL MEDS BY PRESCRIBER/CLIN PHARMACIST DOCUMENTED: ICD-10-PCS | Mod: CPTII,S$GLB,, | Performed by: ORTHOPAEDIC SURGERY

## 2022-05-24 PROCEDURE — 1160F RVW MEDS BY RX/DR IN RCRD: CPT | Mod: CPTII,S$GLB,, | Performed by: ORTHOPAEDIC SURGERY

## 2022-05-24 NOTE — PROGRESS NOTES
Chief Complaint   Patient presents with    Left Hip - Pain    Right Hip - Pain      HPI:   This is a 32 y.o. who presents to clinic today for bilateral hip pain for the past 4 years after no interval trauma. Complains of pain while sleeping on side and when descending stairs. Pain is dull. No numbness or tingling. No associated signs or symptoms. She has failed multiple injections, PT, and NSAIDs.       History reviewed. No pertinent past medical history.  Past Surgical History:   Procedure Laterality Date    breast Bilateral 2010    reduction    BREAST SURGERY Bilateral 2006    augmentation     Current Outpatient Medications on File Prior to Visit   Medication Sig Dispense Refill    ALPRAZolam (XANAX) 0.5 MG tablet Take 1 tablet (0.5 mg total) by mouth 3 (three) times daily as needed for Anxiety. 44 tablet 2    chlorhexidine (HIBICLENS) 4 % external liquid Apply topically 2 (two) times daily. Use to gently cleanse area twice daily. 236 mL 0    dextroamphetamine-amphetamine (ADDERALL XR) 20 MG 24 hr capsule Take 1 capsule (20 mg total) by mouth every morning. 30 capsule 0    dextroamphetamine-amphetamine (ADDERALL XR) 20 MG 24 hr capsule Take 1 capsule (20 mg total) by mouth every morning. 30 capsule 0    dextroamphetamine-amphetamine (ADDERALL XR) 20 MG 24 hr capsule Take 1 capsule (20 mg total) by mouth every morning. 30 capsule 0    dextroamphetamine-amphetamine (ADDERALL XR) 20 MG 24 hr capsule Take 1 capsule (20 mg total) by mouth every morning. 30 capsule 0    dextroamphetamine-amphetamine (ADDERALL XR) 20 MG 24 hr capsule Take 1 capsule (20 mg total) by mouth every morning. 30 capsule 0    ELDERBERRY FRUIT ORAL Take by mouth.      hydrOXYzine pamoate (VISTARIL) 25 MG Cap Take 1 capsule (25 mg total) by mouth at bedtime nightly as needed (sleep) 30 capsule 3    levonorgest-eth.estradioL-iron (BALCOLTRA) 0.1 mg-0.02 mg (21)/36.5 mg(7) Tab Take 1 tablet by mouth once daily. 28 tablet 10     levonorgest-eth.estradioL-iron (BALCOLTRA) 0.1 mg-0.02 mg (21)/36.5 mg(7) Tab Take 1 tablet by mouth daily 28 tablet 3    medroxyPROGESTERone (PROVERA) 10 MG tablet Take 1 tablet (10 mg total) by mouth once daily. 10 tablet 0    meloxicam (MOBIC) 15 MG tablet Take 1 tablet (15 mg total) by mouth once daily. 30 tablet 11    methocarbamoL (ROBAXIN) 750 MG Tab Take 1 tablet (750 mg total) by mouth 4 (four) times daily as needed. 90 tablet 3    metronidazole 0.75% (METROCREAM) 0.75 % Crea Apply topically 2 (two) times daily. 45 g 2    mupirocin (BACTROBAN) 2 % ointment Apply topically 3 (three) times daily. 15 g 3    norgestimate-ethinyl estradiol (SPRINTEC, 28,) 0.25-35 mg-mcg per tablet Take 1 tablet by mouth once daily. 28 tablet 12    ondansetron (ZOFRAN-ODT) 8 MG TbDL Dissolve 1 tablet (8 mg total) on or under the tongue every 6 (six) hours as needed for nausea. 30 tablet 3    progesterone (PROMETRIUM) 200 MG capsule Take one capsule by mouth nightly 10 capsule 9    semaglutide (OZEMPIC) 0.25 mg or 0.5 mg(2 mg/1.5 mL) pen injector Inject 0.25 mg into the skin every 7 days for 14 days, THEN 0.5 mg every 7 days. 5 pen 0    traMADoL (ULTRAM) 50 mg tablet Take 1 tablet (50 mg total) by mouth every 4 (four) hours as needed. 44 tablet 0     Current Facility-Administered Medications on File Prior to Visit   Medication Dose Route Frequency Provider Last Rate Last Admin    diphenhydrAMINE capsule 25 mg  25 mg Oral Once PRN James Pires MD        EPINEPHrine (EPIPEN) 0.3 mg/0.3 mL pen injection 0.3 mg  0.3 mg Intramuscular PRN James Pires MD         Review of patient's allergies indicates:  No Known Allergies  Family History   Problem Relation Age of Onset    Hyperlipidemia Father     Hypertension Father     Cancer Maternal Grandmother     Cancer Paternal Grandfather     Glaucoma Neg Hx     Cataracts Neg Hx     Macular degeneration Neg Hx     Retinal detachment Neg Hx     Amblyopia Neg Hx      Blindness Neg Hx     Strabismus Neg Hx      Social History     Socioeconomic History    Marital status:    Tobacco Use    Smoking status: Never Smoker    Smokeless tobacco: Never Used   Substance and Sexual Activity    Alcohol use: No    Drug use: No    Sexual activity: Yes       Review of Systems:  Constitutional:  Denies fever or chills   Eyes:  Denies change in visual acuity   HENT:  Denies nasal congestion or sore throat   Respiratory:  Denies cough or shortness of breath   Cardiovascular:  Denies chest pain or edema   GI:  Denies abdominal pain, nausea, vomiting, bloody stools or diarrhea   :  Denies dysuria   Integument:  Denies rash   Neurologic:  Denies headache, focal weakness or sensory changes   Endocrine:  Denies polyuria or polydipsia   Lymphatic:  Denies swollen glands   Psychiatric:  Denies depression or anxiety     Physical Exam:   Constitutional:  Well developed, well nourished, no acute distress, non-toxic appearance   Integument:  Well hydrated, no rash   Lymphatic:  No lymphadenopathy noted   Neurologic:  Alert & oriented x 3  Psychiatric:  Speech and behavior appropriate     Bilateral Hip Exam  Tenderness   The patient is experiencing tenderness in the greater trochanter.  Range of Motion   The patient has normal hip ROM.  Muscle Strength   Abduction: 4/5   Other   Erythema: absent  Sensation: normal  Pulse: present    X-rays were personally reviewed by me and findings discussed with the patient.  2 views of the bilateral hip show no fractures or dislocations    Greater trochanteric bursitis of right hip    Greater trochanteric bursitis of left hip           I had a long discussion with the patient regarding the benefits and risks of bilateral hip bursectomy. They voiced understanding and wish to proceed with surgery. Consents signed in clinic.

## 2022-05-24 NOTE — H&P
HPI:   This is a 32 y.o. who presents to clinic today for bilateral hip pain for the past 4 years after no interval trauma. Complains of pain while sleeping on side and when descending stairs. Pain is dull. No numbness or tingling. No associated signs or symptoms. She has failed multiple injections, PT, and NSAIDs.       History reviewed. No pertinent past medical history.  Past Surgical History:   Procedure Laterality Date    breast Bilateral 2010    reduction    BREAST SURGERY Bilateral 2006    augmentation     Current Outpatient Medications on File Prior to Visit   Medication Sig Dispense Refill    ALPRAZolam (XANAX) 0.5 MG tablet Take 1 tablet (0.5 mg total) by mouth 3 (three) times daily as needed for Anxiety. 44 tablet 2    chlorhexidine (HIBICLENS) 4 % external liquid Apply topically 2 (two) times daily. Use to gently cleanse area twice daily. 236 mL 0    dextroamphetamine-amphetamine (ADDERALL XR) 20 MG 24 hr capsule Take 1 capsule (20 mg total) by mouth every morning. 30 capsule 0    dextroamphetamine-amphetamine (ADDERALL XR) 20 MG 24 hr capsule Take 1 capsule (20 mg total) by mouth every morning. 30 capsule 0    dextroamphetamine-amphetamine (ADDERALL XR) 20 MG 24 hr capsule Take 1 capsule (20 mg total) by mouth every morning. 30 capsule 0    dextroamphetamine-amphetamine (ADDERALL XR) 20 MG 24 hr capsule Take 1 capsule (20 mg total) by mouth every morning. 30 capsule 0    dextroamphetamine-amphetamine (ADDERALL XR) 20 MG 24 hr capsule Take 1 capsule (20 mg total) by mouth every morning. 30 capsule 0    ELDERBERRY FRUIT ORAL Take by mouth.      hydrOXYzine pamoate (VISTARIL) 25 MG Cap Take 1 capsule (25 mg total) by mouth at bedtime nightly as needed (sleep) 30 capsule 3    levonorgest-eth.estradioL-iron (BALCOLTRA) 0.1 mg-0.02 mg (21)/36.5 mg(7) Tab Take 1 tablet by mouth once daily. 28 tablet 10    levonorgest-eth.estradioL-iron (BALCOLTRA) 0.1 mg-0.02 mg (21)/36.5 mg(7) Tab Take 1 tablet  by mouth daily 28 tablet 3    medroxyPROGESTERone (PROVERA) 10 MG tablet Take 1 tablet (10 mg total) by mouth once daily. 10 tablet 0    meloxicam (MOBIC) 15 MG tablet Take 1 tablet (15 mg total) by mouth once daily. 30 tablet 11    methocarbamoL (ROBAXIN) 750 MG Tab Take 1 tablet (750 mg total) by mouth 4 (four) times daily as needed. 90 tablet 3    metronidazole 0.75% (METROCREAM) 0.75 % Crea Apply topically 2 (two) times daily. 45 g 2    mupirocin (BACTROBAN) 2 % ointment Apply topically 3 (three) times daily. 15 g 3    norgestimate-ethinyl estradiol (SPRINTEC, 28,) 0.25-35 mg-mcg per tablet Take 1 tablet by mouth once daily. 28 tablet 12    ondansetron (ZOFRAN-ODT) 8 MG TbDL Dissolve 1 tablet (8 mg total) on or under the tongue every 6 (six) hours as needed for nausea. 30 tablet 3    progesterone (PROMETRIUM) 200 MG capsule Take one capsule by mouth nightly 10 capsule 9    semaglutide (OZEMPIC) 0.25 mg or 0.5 mg(2 mg/1.5 mL) pen injector Inject 0.25 mg into the skin every 7 days for 14 days, THEN 0.5 mg every 7 days. 5 pen 0    traMADoL (ULTRAM) 50 mg tablet Take 1 tablet (50 mg total) by mouth every 4 (four) hours as needed. 44 tablet 0     Current Facility-Administered Medications on File Prior to Visit   Medication Dose Route Frequency Provider Last Rate Last Admin    diphenhydrAMINE capsule 25 mg  25 mg Oral Once PRN James Pires MD        EPINEPHrine (EPIPEN) 0.3 mg/0.3 mL pen injection 0.3 mg  0.3 mg Intramuscular PRN James Pires MD         Review of patient's allergies indicates:  No Known Allergies  Family History   Problem Relation Age of Onset    Hyperlipidemia Father     Hypertension Father     Cancer Maternal Grandmother     Cancer Paternal Grandfather     Glaucoma Neg Hx     Cataracts Neg Hx     Macular degeneration Neg Hx     Retinal detachment Neg Hx     Amblyopia Neg Hx     Blindness Neg Hx     Strabismus Neg Hx      Social History     Socioeconomic History     Marital status:    Tobacco Use    Smoking status: Never Smoker    Smokeless tobacco: Never Used   Substance and Sexual Activity    Alcohol use: No    Drug use: No    Sexual activity: Yes       Review of Systems:  Constitutional:  Denies fever or chills   Eyes:  Denies change in visual acuity   HENT:  Denies nasal congestion or sore throat   Respiratory:  Denies cough or shortness of breath   Cardiovascular:  Denies chest pain or edema   GI:  Denies abdominal pain, nausea, vomiting, bloody stools or diarrhea   :  Denies dysuria   Integument:  Denies rash   Neurologic:  Denies headache, focal weakness or sensory changes   Endocrine:  Denies polyuria or polydipsia   Lymphatic:  Denies swollen glands   Psychiatric:  Denies depression or anxiety     Physical Exam:   Constitutional:  Well developed, well nourished, no acute distress, non-toxic appearance   Integument:  Well hydrated, no rash   Lymphatic:  No lymphadenopathy noted   Neurologic:  Alert & oriented x 3  Psychiatric:  Speech and behavior appropriate     Bilateral Hip Exam  Tenderness   The patient is experiencing tenderness in the greater trochanter.  Range of Motion   The patient has normal hip ROM.  Muscle Strength   Abduction: 4/5   Other   Erythema: absent  Sensation: normal  Pulse: present    X-rays were personally reviewed by me and findings discussed with the patient.  2 views of the bilateral hip show no fractures or dislocations    Greater trochanteric bursitis of right hip    Greater trochanteric bursitis of left hip           I had a long discussion with the patient regarding the benefits and risks of bilateral hip bursectomy. They voiced understanding and wish to proceed with surgery. Consents signed in clinic.

## 2022-05-25 RX ORDER — SODIUM CHLORIDE 0.9 % (FLUSH) 0.9 %
10 SYRINGE (ML) INJECTION
Status: DISCONTINUED | OUTPATIENT
Start: 2022-05-25 | End: 2022-09-02

## 2022-06-02 ENCOUNTER — LAB VISIT (OUTPATIENT)
Dept: LAB | Facility: HOSPITAL | Age: 32
End: 2022-06-02
Attending: INTERNAL MEDICINE
Payer: COMMERCIAL

## 2022-06-02 DIAGNOSIS — Z01.818 PREOP TESTING: Primary | ICD-10-CM

## 2022-06-02 DIAGNOSIS — Z01.818 PREOP TESTING: ICD-10-CM

## 2022-06-02 LAB — HCG INTACT+B SERPL-ACNC: 96 MIU/ML

## 2022-06-02 PROCEDURE — 36415 COLL VENOUS BLD VENIPUNCTURE: CPT | Mod: PO | Performed by: ORTHOPAEDIC SURGERY

## 2022-06-02 PROCEDURE — 84702 CHORIONIC GONADOTROPIN TEST: CPT | Performed by: ORTHOPAEDIC SURGERY

## 2022-06-04 ENCOUNTER — LAB VISIT (OUTPATIENT)
Dept: LAB | Facility: HOSPITAL | Age: 32
End: 2022-06-04
Attending: NURSE PRACTITIONER
Payer: COMMERCIAL

## 2022-06-04 DIAGNOSIS — O20.0 THREATENED ABORTION, ANTEPARTUM: ICD-10-CM

## 2022-06-04 LAB — HCG INTACT+B SERPL-ACNC: 115 MIU/ML

## 2022-06-04 PROCEDURE — 84702 CHORIONIC GONADOTROPIN TEST: CPT | Performed by: NURSE PRACTITIONER

## 2022-06-04 PROCEDURE — 36415 COLL VENOUS BLD VENIPUNCTURE: CPT | Mod: PO | Performed by: NURSE PRACTITIONER

## 2022-06-06 PROBLEM — O00.90 ECTOPIC PREGNANCY: Status: ACTIVE | Noted: 2022-06-06

## 2022-07-19 DIAGNOSIS — L03.211 CELLULITIS OF FACE: ICD-10-CM

## 2022-07-19 RX ORDER — MUPIROCIN 20 MG/G
OINTMENT TOPICAL 3 TIMES DAILY
Qty: 15 G | Refills: 3 | Status: SHIPPED | OUTPATIENT
Start: 2022-07-19 | End: 2023-04-11 | Stop reason: SDUPTHER

## 2022-07-19 RX ORDER — MUPIROCIN 20 MG/G
OINTMENT TOPICAL 3 TIMES DAILY
Qty: 15 G | Refills: 3 | Status: CANCELLED | OUTPATIENT
Start: 2022-07-19

## 2022-07-21 DIAGNOSIS — M70.71 BILATERAL HIP BURSITIS: ICD-10-CM

## 2022-07-21 DIAGNOSIS — M70.72 BILATERAL HIP BURSITIS: ICD-10-CM

## 2022-07-21 DIAGNOSIS — M70.62 GREATER TROCHANTERIC BURSITIS OF LEFT HIP: ICD-10-CM

## 2022-07-21 DIAGNOSIS — M70.61 GREATER TROCHANTERIC BURSITIS OF RIGHT HIP: Primary | ICD-10-CM

## 2022-07-21 RX ORDER — SODIUM CHLORIDE 0.9 % (FLUSH) 0.9 %
10 SYRINGE (ML) INJECTION EVERY 6 HOURS PRN
Status: DISCONTINUED | OUTPATIENT
Start: 2022-07-21 | End: 2022-09-02

## 2022-08-01 ENCOUNTER — PATIENT MESSAGE (OUTPATIENT)
Dept: OPTOMETRY | Facility: CLINIC | Age: 32
End: 2022-08-01
Payer: COMMERCIAL

## 2022-08-01 DIAGNOSIS — R73.03 PREDIABETES: Primary | ICD-10-CM

## 2022-08-01 DIAGNOSIS — E88.810 METABOLIC SYNDROME: ICD-10-CM

## 2022-08-01 RX ORDER — SEMAGLUTIDE 1.34 MG/ML
1 INJECTION, SOLUTION SUBCUTANEOUS
Qty: 3 PEN | Refills: 0 | Status: SHIPPED | OUTPATIENT
Start: 2022-08-01 | End: 2022-10-11

## 2022-09-02 PROBLEM — M70.72 BILATERAL HIP BURSITIS: Status: ACTIVE | Noted: 2022-09-02

## 2022-09-02 PROBLEM — M70.71 BILATERAL HIP BURSITIS: Status: ACTIVE | Noted: 2022-09-02

## 2022-09-29 DIAGNOSIS — M70.61 GREATER TROCHANTERIC BURSITIS OF RIGHT HIP: Primary | ICD-10-CM

## 2022-09-29 DIAGNOSIS — M70.62 GREATER TROCHANTERIC BURSITIS OF LEFT HIP: ICD-10-CM

## 2022-09-29 DIAGNOSIS — S76.011A TEAR OF RIGHT GLUTEUS MEDIUS TENDON, INITIAL ENCOUNTER: ICD-10-CM

## 2022-10-05 ENCOUNTER — CLINICAL SUPPORT (OUTPATIENT)
Dept: REHABILITATION | Facility: HOSPITAL | Age: 32
End: 2022-10-05
Attending: ORTHOPAEDIC SURGERY
Payer: COMMERCIAL

## 2022-10-05 DIAGNOSIS — M70.61 GREATER TROCHANTERIC BURSITIS OF RIGHT HIP: ICD-10-CM

## 2022-10-05 DIAGNOSIS — M70.62 GREATER TROCHANTERIC BURSITIS OF LEFT HIP: ICD-10-CM

## 2022-10-05 DIAGNOSIS — R26.89 FUNCTIONAL GAIT ABNORMALITY: ICD-10-CM

## 2022-10-05 DIAGNOSIS — S76.011A TEAR OF RIGHT GLUTEUS MEDIUS TENDON, INITIAL ENCOUNTER: ICD-10-CM

## 2022-10-05 DIAGNOSIS — R29.898 WEAKNESS OF BOTH HIPS: ICD-10-CM

## 2022-10-05 PROCEDURE — 97161 PT EVAL LOW COMPLEX 20 MIN: CPT | Mod: PO | Performed by: PHYSICAL THERAPIST

## 2022-10-06 PROBLEM — R26.89 FUNCTIONAL GAIT ABNORMALITY: Status: ACTIVE | Noted: 2022-10-06

## 2022-10-06 PROBLEM — R29.898 WEAKNESS OF BOTH HIPS: Status: ACTIVE | Noted: 2022-10-06

## 2022-10-06 NOTE — PLAN OF CARE
[facility]  Physical Therapy Initial Evaluation    Name: Bertha Ochoa  Clinic Number: [id number]  Encounter Diagnoses   Name Primary?    Greater trochanteric bursitis of right hip     Greater trochanteric bursitis of left hip     Tear of right gluteus medius tendon, initial encounter     Functional gait abnormality     Weakness of both hips      Dr. Stanford Thomas  Physician Orders: PT Eval and Treat   Medical Diagnosis from Referral:   Greater trochanteric bursitis of right hip   Greater trochanteric bursitis of left hip   Tear of right gluteus medius tendon    S/P 09/02/2022   PROCEDURE: 1. Bilateral trochanteric bursectomy                            2. Piriformis tenotomy  Evaluation Date: 10/5/2022  Authorization Period Expiration: 09/29/2023  Plan of Care Expiration: 12/04/2022  Visit # / Visits authorized: 1    Time In: 1200  Time Out: 1300  Total Billable Time: 60 minutes    Precautions: Standard, WBAT    Subjective   Date of onset: 09/02/2022  History of current condition - Bertha reports: having insidious onset of hip pain > 6 months with no mechanism noted. No falls noted. Patient reported having right hip pain > left side that has affected her walking and pivoting along with community distance task. Patient reported having swelling to the hips prior with hip drop as well of the right side. Patient motivated on started therapy and walking independently again.       Past Medical History:   Diagnosis Date    History of MRSA infection 01/2022    facial     Bertha Ochoa  has a past surgical history that includes Breast surgery (Bilateral, 2006); breast (Bilateral, 2010); Excision of bursa of hip (Bilateral, 9/2/2022); Dilation and curettage of uterus (N/A, 9/2/2022); and Thermal ablation of endometrium using hysteroscopy (N/A, 9/2/2022).    Bertha has a current medication list which includes the following prescription(s): acetaminophen, alprazolam,  chlorhexidine, elderberry fruit, ibuprofen, balcoltra, mupirocin, ondansetron, oxycodone, pregabalin, ozempic, and tramadol, and the following Facility-Administered Medications: diphenhydramine and lactated ringers.    Review of patient's allergies indicates:  No Known Allergies     Imaging: none recently  2020:  No radiographically evident acute fracture, dislocation, or osseous destructive process appreciated.  There is bilateral gluteus tendon insertion greater trochanteric enthesophyte formation versus calcific tendinitis, right greater than left.  No radiographic evidence of osteonecrosis of the femoral heads.  The SI joints are unremarkable.  The visualized lower lumbar spine is unremarkable.  The visualized soft tissues of the pelvis show no significant abnormality.    Prior Therapy: none noted  Social History:  lives with their family  Occupation: [employer]   Work demands: prolong standing, walking, seated working, assisting patients with surgeon  Leisure activities: walking, gym and family time  Prior Level of Function: independent  Current Level of Function/limitation: modified independent, increase time noted with home management  Recent or major surgery: 09/02/2022  Accidents: none noted    Pain:  Current 5/10, worst 8/10, best 3/10   Location: bilateral hip   Description: Aching, Dull, Grabbing, Tight, and Variable  Constant symptoms: no  Intermittent symptoms: yes  Worse: Sitting, Standing, Bending, Walking, Flexing, Lifting, and Getting out of bed/chair  Better: changing positions, stretching     Disturbed sleep: yes  Is it positional? yes  Unexplained weight loss: no    Pts goals: Decrease hip pain, improve hip motion and strength. Return to walking independently without limping.    Objective   Posture: neutral    Lumbar screen:  Decrease lumbar extension by 50% noted.  Repeated test:  Flexion in standing: no reproducible s/s  Extension in standing: no reproducible  s/s    AROM:  Right hip limited to 90 degrees with muscle guarding compared to left hip  Right hip pain noted with flexion, ER, and abduction    Palpation: Point tenderness to right GT/glut med region, proximal TFL and distal ITB       Right Left Comment   Hip Flexion: 4/5 4+/5    Hip ABD: 4-/5 4-/5    Hip ADD: 4-/5 4-/5    Hip Extension: 4-/5 4-/5    Knee Ext: 4+/5 5/5    Knee Flex: 4/5 4+/5        Special Test:  SLR -  Hip:  Eloy+ right side   Scouring (hip pathology)-    Hip joint distraction  HIP Lag Sign(indicated for possible glut-medius involvement) + right side  Bridge test + for R- hip drop  SL bridge stability test: R hip drop compared to left hip    Transfer: modified independent, increase time noted with sit to stands, increase weight shifting to .LLE.    Gait: independent of 50 ft, antalgic gait though due to bilateral hip pain (R>L)  Gait deviations: decreased jeremias, decreased step length to RLE, R trendelenburg sign      CMS Impairment/Limitation/Restriction for FOTO Hip Survey  Status Limitation G-Code CMS Severity Modifier  Intake 56% 44% Current Status CK - At least 40 percent but less than 60 percent  Predicted 79% 21% Goal Status+ CJ - At least 20 percent but less than 40 percent       TREATMENT   Treatment Time In: 1230  Treatment Time Out: 1235  Total Treatment time separate from Evaluation: 5 minutes    Bertha received therapeutic exercises to develop strength, endurance, ROM, flexibility, posture, and core stabilization for 5 minutes including:  Supine: glut sets, bridges, hip abduction isometrics with belt  Progress in to S/L clam shells  Prone lying in extension    Home Exercises and Patient Education Provided    Education provided:   - Yes    Written Home Exercises Provided: yes.  Exercises were reviewed and Bertha was able to demonstrate them prior to the end of the session.  Bertha demonstrated good  understanding of the education provided.     See EMR under Patient  Instructions for exercises provided 10/5/2022.    Assessment   Bertha is a 32 y.o. female referred to outpatient Physical Therapy with a medical diagnosis of Greater trochanteric bursitis of right hip   Greater trochanteric bursitis of left hip ,Tear of right gluteus medius tendon. Pt presents with gait abnormality, right > left hip pain, hip/core weakness, decreased lumbar/hip motion, decreased activity tolerance with ADL tasks.    Problem List: pain, decreased ROM, decreased flexibility, decreased strength, decreased balance and stability, decreased motor control, antalgic gait, inability to participate fully in vocation pursuits, and decreased ability to fully participate in recreational/sports related activities.    Pt prognosis is Good.   Pt will benefit from skilled outpatient Physical Therapy to address the deficits stated above and in the chart below, provide pt/family education, and to maximize pt's level of independence.     Plan of care discussed with patient: Yes  Pt's spiritual, cultural and educational needs considered and patient is agreeable to the plan of care and goals as stated below:     Anticipated Barriers for therapy: none    Medical Necessity is demonstrated by the following  History  Co-morbidities and personal factors that may impact the plan of care Co-morbidities:   prior hip surgery    Personal Factors:   no deficits     moderate   Examination  Body Structures and Functions, activity limitations and participation restrictions that may impact the plan of care Body Regions:   back  lower extremities  trunk    Body Systems:    ROM  strength  gross coordinated movement  balance  gait  transfers  transitions  motor control    Participation Restrictions:   Home management  Community ambulation    Activity limitations:   Learning and applying knowledge  no deficits    General Tasks and Commands  no deficits    Communication  no deficits    Mobility  lifting and carrying  "objects  walking    Self care  no deficits    Domestic Life  shopping  cooking  doing house work (cleaning house, washing dishes, laundry)    Interactions/Relationships  no deficits    Life Areas  no deficits    Community and Social Life  no deficits         high   Clinical Presentation stable and uncomplicated low   Decision Making/ Complexity Score: low     Short Term GOALS:  In 4 weeks, pt. will:  - improve hip MMT 1/2 grade for ambulatory purposes.  - improve lumbar/hip motion by 25% for mobility purposes.  - demonstrate bridge test up 30" for sit to stand purposes.  - ambulate > 150 ft independently with no 50% reduction in hip drop.  - decrease outcome measure limitation to <44%    Long Term GOALS:  In 8 weeks, pt. will:  - be independent and compliant with HEP and SX management   - decrease outcome measure limitation to <40%  - demonstrate hip MMT 4 or > for ambulatory purposes.  - demonstrate bridge test > 30" with no hip drop for stability motor control purposes.  - return to community ambulation independently with no painful limitations.    Plan   Plan of care Certification: 10/5/2022 to 12/04/2022.  Outpatient Physical Therapy 2 times weekly for 8 weeks to include the following interventions: Aquatic Therapy, Electrical Stimulation IFC, Gait Training, Manual Therapy, Moist Heat/ Ice, Neuromuscular Re-ed, Patient Education, Therapeutic Activities, Therapeutic Exercise, and dry needling.      Bertha may at times be seen by a PTA as part of the Rehab Team.    Brendan Al, PT       "

## 2022-10-11 ENCOUNTER — CLINICAL SUPPORT (OUTPATIENT)
Dept: REHABILITATION | Facility: HOSPITAL | Age: 32
End: 2022-10-11
Attending: ORTHOPAEDIC SURGERY
Payer: COMMERCIAL

## 2022-10-11 DIAGNOSIS — R29.898 WEAKNESS OF BOTH HIPS: ICD-10-CM

## 2022-10-11 DIAGNOSIS — R26.89 FUNCTIONAL GAIT ABNORMALITY: Primary | ICD-10-CM

## 2022-10-11 PROCEDURE — 97110 THERAPEUTIC EXERCISES: CPT | Mod: PO,97 | Performed by: PHYSICAL THERAPIST

## 2022-10-11 RX ORDER — SEMAGLUTIDE 1.34 MG/ML
1 INJECTION, SOLUTION SUBCUTANEOUS
Qty: 1 PEN | Refills: 2 | Status: SHIPPED | OUTPATIENT
Start: 2022-10-11 | End: 2023-03-01 | Stop reason: SDUPTHER

## 2022-10-11 NOTE — PROGRESS NOTES
Physical Therapy Daily Treatment Note     Name: Bertha Omalley [facility] Number: [id number]    Therapy Diagnosis:   Encounter Diagnoses   Name Primary?    Functional gait abnormality Yes    Weakness of both hips      Physician: Stanford Thomas MD    Visit Date: 10/11/2022  Physician Orders: PT Eval and Treat   Medical Diagnosis from Referral:   Greater trochanteric bursitis of right hip   Greater trochanteric bursitis of left hip   Tear of right gluteus medius tendon     S/P 09/02/2022   PROCEDURE: 1. Bilateral trochanteric bursectomy                            2. Piriformis tenotomy  Evaluation Date: 10/5/2022  Authorization Period Expiration: 09/29/2023  Plan of Care Expiration: 12/04/2022  Visit # / Visits authorized: 2     Time In: 1230  Time Out: 1300  Total Billable Time: 30 minutes     Precautions: Standard, WBAT    Subjective     Pt reports: working on HEP and has noticed a good change in her pain behavior with walking. Patient reported getting a left adductor injection this afternoon for the adductor spasms. No back pain.  She was compliant with home exercise program.  Response to previous treatment: muscle soreness  Functional change: walking with less of a limp    Pain: 2/10  Location: bilateral R>L     Objective     Bertha received therapeutic exercises to develop strength, endurance, ROM, flexibility, posture, and core stabilization for 30 minutes including:  Upright bike: elevated for mobility purposes 5'  Standing GSS on slant board x 1'  Supine: TA with green band 2/10  Supine: TA with green band, bridge 2/10  S/L clam shells with red band 2/10    Shutte:  3.5b 3/10  2b 2/10 each (SL)    Standing: hip abd walk with red band x 2'    Previous: below  Posture: neutral     Lumbar screen:  Decrease lumbar extension by 50% noted.  Repeated test:  Flexion in standing: no reproducible s/s  Extension in standing: no reproducible s/s     AROM:  Right hip limited to 90 degrees with muscle guarding  "compared to left hip  Right hip pain noted with flexion, ER, and abduction     Palpation: Point tenderness to right GT/glut med region, proximal TFL and distal ITB       Right Left Comment   Hip Flexion: 4/5 4+/5     Hip ABD: 4-/5 4-/5     Hip ADD: 4-/5 4-/5     Hip Extension: 4-/5 4-/5     Knee Ext: 4+/5 5/5     Knee Flex: 4/5 4+/5           Special Test:  SLR -  Hip:  Eloy+ right side   Scouring (hip pathology)-    Hip joint distraction  HIP Lag Sign(indicated for possible glut-medius involvement) + right side  Bridge test + for R- hip drop  SL bridge stability test: R hip drop compared to left hip     Home Exercises Provided and Patient Education Provided   Yes    Written Home Exercises Provided: Patient instructed to cont prior HEP.  Exercises were reviewed and Bertha was able to demonstrate them prior to the end of the session.  Bertha demonstrated good  understanding of the education provided.     See EMR under Patient Instructions for exercises provided prior visit.    Assessment     Patient was given tactile input and cueing on TA activation and hip abduction with good tolerance overall. No adverse effects.    Bertha Is progressing well towards her goals.   Pt prognosis is Good.     Pt will continue to benefit from skilled outpatient physical therapy to address the deficits listed in the problem list box on initial evaluation, provide pt/family education and to maximize pt's level of independence in the home and community environment.     Pt's spiritual, cultural and educational needs considered and pt agreeable to plan of care and goals.    Anticipated barriers to physical therapy: none    Goals:   Short Term GOALS:  In 4 weeks, pt. will:  - improve hip MMT 1/2 grade for ambulatory purposes.  - improve lumbar/hip motion by 25% for mobility purposes.  - demonstrate bridge test up 30" for sit to stand purposes.  - ambulate > 150 ft independently with no 50% reduction in hip drop.  - decrease outcome " "measure limitation to <44%     Long Term GOALS:  In 8 weeks, pt. will:  - be independent and compliant with HEP and SX management   - decrease outcome measure limitation to <40%  - demonstrate hip MMT 4 or > for ambulatory purposes.  - demonstrate bridge test > 30" with no hip drop for stability motor control purposes.  - return to community ambulation independently with no painful limitations.     Plan     Continue with JONA Al, PT      "

## 2022-10-13 ENCOUNTER — CLINICAL SUPPORT (OUTPATIENT)
Dept: REHABILITATION | Facility: HOSPITAL | Age: 32
End: 2022-10-13
Attending: ORTHOPAEDIC SURGERY
Payer: COMMERCIAL

## 2022-10-13 DIAGNOSIS — R26.89 FUNCTIONAL GAIT ABNORMALITY: Primary | ICD-10-CM

## 2022-10-13 DIAGNOSIS — R29.898 WEAKNESS OF BOTH HIPS: ICD-10-CM

## 2022-10-13 PROCEDURE — 97110 THERAPEUTIC EXERCISES: CPT | Mod: PO | Performed by: PHYSICAL THERAPIST

## 2022-10-13 NOTE — PROGRESS NOTES
Physical Therapy Daily Treatment Note     Name: Bertha Omalley [facility] Number: [id number]    Therapy Diagnosis:   Encounter Diagnoses   Name Primary?    Functional gait abnormality Yes    Weakness of both hips      Physician: Stanford Thomas MD    Visit Date: 10/13/2022  Physician Orders: PT Eval and Treat   Medical Diagnosis from Referral:   Greater trochanteric bursitis of right hip   Greater trochanteric bursitis of left hip   Tear of right gluteus medius tendon     S/P 09/02/2022   PROCEDURE: 1. Bilateral trochanteric bursectomy                            2. Piriformis tenotomy  Evaluation Date: 10/5/2022  Authorization Period Expiration: 09/29/2023  Plan of Care Expiration: 12/04/2022  Visit # / Visits authorized: 2     Time In: 1230  Time Out: 1300  Total Billable Time: 30 minutes     Precautions: Standard, WBAT    Subjective     Pt reports: feeling better and walking with less difficulty.  She was compliant with home exercise program.  Response to previous treatment: muscle soreness  Functional change: walking with less of a limp    Pain: 1-2/10  Location: bilateral R>L     Objective     Bertha received therapeutic exercises to develop strength, endurance, ROM, flexibility, posture, and core stabilization for 30 minutes including:    Upright bike: elevated for mobility purposes 5'  Standing GSS on slant board x 1'  Supine: TA with green band 2/10  Supine: TA with green band, bridge 2/10, progressed to single leg bridge 2/10 each  S/L clam shells with red band 2/10    Shutte: (NP)  3.5b 3/10  2b 2/10 each (SL)  Mule kicks: 2/10 2b each    Machine:  Leg press: 60# 3/10 DL, SL 20# 2/10 each  Hip abduction: 40# 2/10  Hip adduction: 30# 2/10    Standing: hip abd walk with red band x 2'    Previous: below  Posture: neutral     Lumbar screen:  Decrease lumbar extension by 50% noted.  Repeated test:  Flexion in standing: no reproducible s/s  Extension in standing: no reproducible s/s     AROM:  Right hip  "limited to 90 degrees with muscle guarding compared to left hip  Right hip pain noted with flexion, ER, and abduction     Palpation: Point tenderness to right GT/glut med region, proximal TFL and distal ITB       Right Left Comment   Hip Flexion: 4/5 4+/5     Hip ABD: 4-/5 4-/5     Hip ADD: 4-/5 4-/5     Hip Extension: 4-/5 4-/5     Knee Ext: 4+/5 5/5     Knee Flex: 4/5 4+/5           Special Test:  SLR -  Hip:  Eloy+ right side   Scouring (hip pathology)-    Hip joint distraction  HIP Lag Sign(indicated for possible glut-medius involvement) + right side  Bridge test + for R- hip drop  SL bridge stability test: R hip drop compared to left hip     Home Exercises Provided and Patient Education Provided   Yes    Written Home Exercises Provided: Patient instructed to cont prior HEP.  Exercises were reviewed and Bertha was able to demonstrate them prior to the end of the session.  Bertha demonstrated good  understanding of the education provided.     See EMR under Patient Instructions for exercises provided prior visit.    Assessment   Good tolerance with TE progression with good effort on hip extension. No adverse effects.    Bertha Is progressing well towards her goals.   Pt prognosis is Good.     Pt will continue to benefit from skilled outpatient physical therapy to address the deficits listed in the problem list box on initial evaluation, provide pt/family education and to maximize pt's level of independence in the home and community environment.     Pt's spiritual, cultural and educational needs considered and pt agreeable to plan of care and goals.    Anticipated barriers to physical therapy: none    Goals:   Short Term GOALS:  In 4 weeks, pt. will:  - improve hip MMT 1/2 grade for ambulatory purposes.  - improve lumbar/hip motion by 25% for mobility purposes.  - demonstrate bridge test up 30" for sit to stand purposes.  - ambulate > 150 ft independently with no 50% reduction in hip drop.  - decrease " "outcome measure limitation to <44%     Long Term GOALS:  In 8 weeks, pt. will:  - be independent and compliant with HEP and SX management   - decrease outcome measure limitation to <40%  - demonstrate hip MMT 4 or > for ambulatory purposes.  - demonstrate bridge test > 30" with no hip drop for stability motor control purposes.  - return to community ambulation independently with no painful limitations.     Plan     Continue with JONA Al, PT        "

## 2022-10-18 ENCOUNTER — CLINICAL SUPPORT (OUTPATIENT)
Dept: REHABILITATION | Facility: HOSPITAL | Age: 32
End: 2022-10-18
Attending: ORTHOPAEDIC SURGERY
Payer: COMMERCIAL

## 2022-10-18 DIAGNOSIS — R26.89 FUNCTIONAL GAIT ABNORMALITY: Primary | ICD-10-CM

## 2022-10-18 DIAGNOSIS — R29.898 WEAKNESS OF BOTH HIPS: ICD-10-CM

## 2022-10-18 PROCEDURE — 97110 THERAPEUTIC EXERCISES: CPT | Mod: PO | Performed by: PHYSICAL THERAPIST

## 2022-10-18 NOTE — PROGRESS NOTES
Physical Therapy Daily Treatment Note     Name: Bertha Omalley Don  Clinic Number: [id number]    Therapy Diagnosis:   Encounter Diagnoses   Name Primary?    Functional gait abnormality Yes    Weakness of both hips      Physician: Stanford Thomas MD    Visit Date: 10/18/2022  Physician Orders: PT Eval and Treat   Medical Diagnosis from Referral:   Greater trochanteric bursitis of right hip   Greater trochanteric bursitis of left hip   Tear of right gluteus medius tendon     S/P 09/02/2022   PROCEDURE:  1. Bilateral trochanteric bursectomy                            2. Piriformis tenotomy  Evaluation Date: 10/5/2022  Authorization Period Expiration: 09/29/2023  Plan of Care Expiration: 12/04/2022  Visit # / Visits authorized: 3     Time In: 0800  Time Out: 0830  Total Billable Time: 30 minutes     Precautions: Standard, WBAT    Subjective     Pt reports: standing with less pain and will have to leave early due to work related purposes.  She was compliant with home exercise program.  Response to previous treatment: muscle soreness  Functional change: walking with less of a limp    Pain: 1-2/10  Location: bilateral R>L     Objective     Bertha received therapeutic exercises to develop strength, endurance, ROM, flexibility, posture, and core stabilization for 30 minutes including:    Elliptical x 5 minutes  Standing GSS on slant board x 1'  Supine: TA with blue band 2/10  Supine: TA with blue band, bridge 2/10, progressed to single leg bridge 2/10 each  S/L clam shells with blue band 2/10  Quadruped: alternating x 10 with TA    Shutte: (NP)  3.5b 3/10  2b 2/10 each (SL)  Mule kicks: 2/10 2b each    Machine:  Leg press: 70# 3/10 DL, SL 30# 2/10 each  Hip abduction: 45# 2/10  Hip adduction: 30# 2/10 (NP)    Standing: hip abd walk with red band x 2'    Previous: below  Posture: neutral     Lumbar screen:  Decrease lumbar extension by 50% noted.  Repeated test:  Flexion in standing: no reproducible s/s  Extension in  "standing: no reproducible s/s     AROM:  Right hip limited to 90 degrees with muscle guarding compared to left hip  Right hip pain noted with flexion, ER, and abduction     Palpation: Point tenderness to right GT/glut med region, proximal TFL and distal ITB       Right Left Comment   Hip Flexion: 4/5 4+/5     Hip ABD: 4-/5 4-/5     Hip ADD: 4-/5 4-/5     Hip Extension: 4-/5 4-/5     Knee Ext: 4+/5 5/5     Knee Flex: 4/5 4+/5         Home Exercises Provided and Patient Education Provided   Yes    Written Home Exercises Provided: Patient instructed to cont prior HEP.  Exercises were reviewed and Bertha was able to demonstrate them prior to the end of the session.  Bertha demonstrated good  understanding of the education provided.     See EMR under Patient Instructions for exercises provided prior visit.    Assessment   Patient tolerated TE progression with improvement in step length of RLE. Trendelenburg still observable but less noted. No adverse effects.    Bertha Is progressing well towards her goals.   Pt prognosis is Good.     Pt will continue to benefit from skilled outpatient physical therapy to address the deficits listed in the problem list box on initial evaluation, provide pt/family education and to maximize pt's level of independence in the home and community environment.     Pt's spiritual, cultural and educational needs considered and pt agreeable to plan of care and goals.    Anticipated barriers to physical therapy: none    Goals:   Short Term GOALS:  In 4 weeks, pt. will:  - improve hip MMT 1/2 grade for ambulatory purposes.  - improve lumbar/hip motion by 25% for mobility purposes.  - demonstrate bridge test up 30" for sit to stand purposes.  - ambulate > 150 ft independently with no 50% reduction in hip drop.  - decrease outcome measure limitation to <44%     Long Term GOALS:  In 8 weeks, pt. will:  - be independent and compliant with HEP and SX management   - decrease outcome measure " "limitation to <40%  - demonstrate hip MMT 4 or > for ambulatory purposes.  - demonstrate bridge test > 30" with no hip drop for stability motor control purposes.  - return to community ambulation independently with no painful limitations.     Plan     Continue with POC    Brendan Al, PT          "

## 2022-10-20 ENCOUNTER — CLINICAL SUPPORT (OUTPATIENT)
Dept: REHABILITATION | Facility: HOSPITAL | Age: 32
End: 2022-10-20
Attending: ORTHOPAEDIC SURGERY
Payer: COMMERCIAL

## 2022-10-20 DIAGNOSIS — R29.898 WEAKNESS OF BOTH HIPS: ICD-10-CM

## 2022-10-20 DIAGNOSIS — R26.89 FUNCTIONAL GAIT ABNORMALITY: Primary | ICD-10-CM

## 2022-10-20 PROCEDURE — 97110 THERAPEUTIC EXERCISES: CPT | Mod: PO | Performed by: PHYSICAL THERAPIST

## 2022-10-20 NOTE — PROGRESS NOTES
Physical Therapy Daily Treatment Note     [id number]    Therapy Diagnosis:   Encounter Diagnoses   Name Primary?    Functional gait abnormality Yes    Weakness of both hips      Physician: Stanford Thomas MD    Visit Date: 10/20/2022  Physician Orders: PT Eval and Treat   Medical Diagnosis from Referral:   Greater trochanteric bursitis of right hip   Greater trochanteric bursitis of left hip   Tear of right gluteus medius tendon     S/P 09/02/2022   PROCEDURE:  1. Bilateral trochanteric bursectomy                            2. Piriformis tenotomy  Evaluation Date: 10/5/2022  Authorization Period Expiration: 09/29/2023  Plan of Care Expiration: 12/04/2022  Visit # / Visits authorized: 4     Time In: 0800  Time Out: 0845  Total Billable Time: 40 minutes     Precautions: Standard, WBAT    Subjective     Pt reports: being more active around the house. No new s/s and has been compliant with HEP.  She was compliant with home exercise program.  Response to previous treatment: muscle soreness  Functional change: walking with less of a limp    Pain: 1-2/10  Location: bilateral R>L     Objective     Bertha received therapeutic exercises to develop strength, endurance, ROM, flexibility, posture, and core stabilization for 40 minutes including:    Elliptical x 5 minutes  Standing GSS on slant board x 1'  Manual: hip abd stretch, elli axis distraction (R)  Supine: TA with blue band 2/10  Supine: TA with blue band, bridge 2/10, progressed to single leg bridge 2/10 each  S/L clam shells with blue band 2/10  Quadruped: alternating x 10 with TA    Shutte:   4.5 b 3/10  3b 3/10 each (SL)  Mule kicks: 2/10 2b each    Machine:  Leg press: 70# 3/10 DL, SL 30# 2/10 each  Hip abduction: 45# 2/10  Hip adduction: 30# 2/10 (NP)    Standing: hip abd walk with red band x 2'    Previous: below  Posture: neutral     Lumbar screen:  Decrease lumbar extension by 50% noted.  Repeated test:  Flexion in  "standing: no reproducible s/s  Extension in standing: no reproducible s/s     AROM:  Right hip limited to 90 degrees with muscle guarding compared to left hip  Right hip pain noted with flexion, ER, and abduction     Palpation: Point tenderness to right GT/glut med region, proximal TFL and distal ITB       Right Left Comment   Hip Flexion: 4/5 4+/5     Hip ABD: 4-/5 4-/5     Hip ADD: 4-/5 4-/5     Hip Extension: 4-/5 4-/5     Knee Ext: 4+/5 5/5     Knee Flex: 4/5 4+/5         Home Exercises Provided and Patient Education Provided   Yes    Written Home Exercises Provided: Patient instructed to cont prior HEP.  Exercises were reviewed and Bertha was able to demonstrate them prior to the end of the session.  Bertha demonstrated good  understanding of the education provided.     See EMR under Patient Instructions for exercises provided prior visit.    Assessment   Patient tolerated therapy with improvement in strength evident from band tolerance up to 20 reps.    Bertha Is progressing well towards her goals.   Pt prognosis is Good.     Pt will continue to benefit from skilled outpatient physical therapy to address the deficits listed in the problem list box on initial evaluation, provide pt/family education and to maximize pt's level of independence in the home and community environment.     Pt's spiritual, cultural and educational needs considered and pt agreeable to plan of care and goals.    Anticipated barriers to physical therapy: none    Goals:   Short Term GOALS:  In 4 weeks, pt. will:  - improve hip MMT 1/2 grade for ambulatory purposes.  - improve lumbar/hip motion by 25% for mobility purposes.  - demonstrate bridge test up 30" for sit to stand purposes.  - ambulate > 150 ft independently with no 50% reduction in hip drop.  - decrease outcome measure limitation to <44%     Long Term GOALS:  In 8 weeks, pt. will:  - be independent and compliant with HEP and SX management   - decrease outcome measure " "limitation to <40%  - demonstrate hip MMT 4 or > for ambulatory purposes.  - demonstrate bridge test > 30" with no hip drop for stability motor control purposes.  - return to community ambulation independently with no painful limitations.     Plan     Continue with POC    Brendan Al, PT            "

## 2022-10-25 ENCOUNTER — CLINICAL SUPPORT (OUTPATIENT)
Dept: REHABILITATION | Facility: HOSPITAL | Age: 32
End: 2022-10-25
Attending: ORTHOPAEDIC SURGERY
Payer: COMMERCIAL

## 2022-10-25 DIAGNOSIS — R26.89 FUNCTIONAL GAIT ABNORMALITY: Primary | ICD-10-CM

## 2022-10-25 DIAGNOSIS — R29.898 WEAKNESS OF BOTH HIPS: ICD-10-CM

## 2022-10-25 PROCEDURE — 97110 THERAPEUTIC EXERCISES: CPT | Mod: PO | Performed by: PHYSICAL THERAPIST

## 2022-10-25 RX ORDER — FLUTICASONE PROPIONATE 0.5 MG/G
1 CREAM TOPICAL 2 TIMES DAILY
Qty: 60 G | Refills: 5 | Status: SHIPPED | OUTPATIENT
Start: 2022-10-25 | End: 2023-12-21 | Stop reason: SDUPTHER

## 2022-10-25 NOTE — PROGRESS NOTES
Physical Therapy Daily Treatment Note     [id number]    Therapy Diagnosis:   Encounter Diagnoses   Name Primary?    Functional gait abnormality Yes    Weakness of both hips      Physician: Stanford Thomas MD    Visit Date: 10/25/2022  Physician Orders: PT Eval and Treat   Medical Diagnosis from Referral:   Greater trochanteric bursitis of right hip   Greater trochanteric bursitis of left hip   Tear of right gluteus medius tendon     S/P 09/02/2022   PROCEDURE:  1. Bilateral trochanteric bursectomy                            2. Piriformis tenotomy  Evaluation Date: 10/5/2022  Authorization Period Expiration: 09/29/2023  Plan of Care Expiration: 12/04/2022  Visit # / Visits authorized: 6     Time In: 0800  Time Out: 0845  Total Billable Time: 40 minutes     Precautions: Standard, WBAT    Subjective     Pt reports: stiffness from a busy weekend. No falls noted. Muscle soreness R>L side noted.  She was compliant with home exercise program.  Response to previous treatment: muscle soreness  Functional change: walking with less of a limp    Pain: 1/10  Location: bilateral R>L     Objective     Bertha received therapeutic exercises to develop strength, endurance, ROM, flexibility, posture, and core stabilization for 40 minutes including:    Elliptical x 5 minutes  Standing GSS on slant board x 1'  Manual: hip abd stretch, elli axis distraction (R)  Supine: TA with blue band 2/10  Supine: TA with blue band, bridge 2/10, progressed to single leg bridge 2/10 each  S/L clam shells with blue band 2/10  Quadruped: alternating x 10 with TA    Shutte:   4.5 b 3/10  3b 3/10 each (SL)  Mule kicks: 2/10 2b each    Added: S/L hip abd/ER on sliding board 2/10 (R)    Machine:  Leg press: 70# 3/10 DL, SL 30# 2/10 each  Hip abduction: 45# 2/10  Hip adduction: 30# 2/10 (NP)    Standing: hip abd walk with red band x 2'    Previous: below  Posture: neutral     Lumbar screen:  Decrease lumbar  "extension by 50% noted.  Repeated test:  Flexion in standing: no reproducible s/s  Extension in standing: no reproducible s/s     AROM:  Right hip limited to 90 degrees with muscle guarding compared to left hip  Right hip pain noted with flexion, ER, and abduction     Palpation: Point tenderness to right GT/glut med region, proximal TFL and distal ITB       Right Left Comment   Hip Flexion: 4/5 4+/5     Hip ABD: 4-/5 4-/5     Hip ADD: 4-/5 4-/5     Hip Extension: 4-/5 4-/5     Knee Ext: 4+/5 5/5     Knee Flex: 4/5 4+/5         Home Exercises Provided and Patient Education Provided   Yes    Written Home Exercises Provided: Patient instructed to cont prior HEP.  Exercises were reviewed and Bertha was able to demonstrate them prior to the end of the session.  Bertha demonstrated good  understanding of the education provided.     See EMR under Patient Instructions for exercises provided prior visit.    Assessment   Patient noted with fatigue and juddering during hip abd/er sliding board TE. Overall patient is demonstrated improvement with ambulatory tasks with less pelvic drop on right side. No adverse effects.    Bertha Is progressing well towards her goals.   Pt prognosis is Good.     Pt will continue to benefit from skilled outpatient physical therapy to address the deficits listed in the problem list box on initial evaluation, provide pt/family education and to maximize pt's level of independence in the home and community environment.     Pt's spiritual, cultural and educational needs considered and pt agreeable to plan of care and goals.    Anticipated barriers to physical therapy: none    Goals:   Short Term GOALS:  In 4 weeks, pt. will:  - improve hip MMT 1/2 grade for ambulatory purposes.  - improve lumbar/hip motion by 25% for mobility purposes.  - demonstrate bridge test up 30" for sit to stand purposes.  - ambulate > 150 ft independently with no 50% reduction in hip drop.  - decrease outcome measure " "limitation to <44%     Long Term GOALS:  In 8 weeks, pt. will:  - be independent and compliant with HEP and SX management   - decrease outcome measure limitation to <40%  - demonstrate hip MMT 4 or > for ambulatory purposes.  - demonstrate bridge test > 30" with no hip drop for stability motor control purposes.  - return to community ambulation independently with no painful limitations.     Plan     Continue with JONA Al, PT              "

## 2022-10-27 ENCOUNTER — CLINICAL SUPPORT (OUTPATIENT)
Dept: REHABILITATION | Facility: HOSPITAL | Age: 32
End: 2022-10-27
Attending: ORTHOPAEDIC SURGERY
Payer: COMMERCIAL

## 2022-10-27 DIAGNOSIS — R26.89 FUNCTIONAL GAIT ABNORMALITY: Primary | ICD-10-CM

## 2022-10-27 DIAGNOSIS — R29.898 WEAKNESS OF BOTH HIPS: ICD-10-CM

## 2022-10-27 PROCEDURE — 97110 THERAPEUTIC EXERCISES: CPT | Mod: PO,97 | Performed by: PHYSICAL THERAPIST

## 2022-10-27 PROCEDURE — 97140 MANUAL THERAPY 1/> REGIONS: CPT | Mod: PO | Performed by: PHYSICAL THERAPIST

## 2022-10-27 NOTE — PROGRESS NOTES
Physical Therapy Daily Treatment Note     Name: Bertha Omalley [facility] Number: [id number]    Therapy Diagnosis:   Encounter Diagnoses   Name Primary?    Functional gait abnormality Yes    Weakness of both hips      Physician: Stanford Thomas MD    Visit Date: 10/27/2022  Physician Orders: PT Eval and Treat   Medical Diagnosis from Referral:   Greater trochanteric bursitis of right hip   Greater trochanteric bursitis of left hip   Tear of right gluteus medius tendon     S/P 09/02/2022   PROCEDURE:  1. Bilateral trochanteric bursectomy                            2. Piriformis tenotomy  Evaluation Date: 10/5/2022  Authorization Period Expiration: 09/29/2023  Plan of Care Expiration: 12/04/2022  Visit # / Visits authorized: 7     Time In: 0800  Time Out: 0900  Total Billable Time: 45 minutes     Precautions: Standard, WBAT    Subjective     Pt reports: feeling better from previous session. Right lateral hip muscle soreness down to the right knee (lateral). No new s/s.   She was compliant with home exercise program.  Response to previous treatment: muscle soreness  Functional change: walking with less of a limp    Pain: 1/10  Location: bilateral R>L     Objective     Bertha received therapeutic exercises to develop strength, endurance, ROM, flexibility, posture, and core stabilization for 30 minutes including:    Elliptical x 5 minutes  Standing GSS on slant board x 1'  Manual: hip abd stretch, elli axis distraction (R)  Supine: TA with blue band 2/10  Supine: TA with blue band, bridge 2/10, progressed to single leg bridge 2/10 each  S/L clam shells with blue band 2/10  Quadruped: alternating x 10 with TA    Shutte:   4.5 b 3/10  3b 3/10 each (SL)9  Mule kicks: 2/10 2b each    Added: S/L hip abd/ER on sliding board 2/10 (R)    Machine:  Leg press: 70# 3/10 DL, SL 30# 2/10 each  Hip abduction: 45# 2/10  Hip adduction: 30# 2/10 (NP)    Standing: hip abd walk with red band x 2'    Previous: below  Posture:  neutral     Lumbar screen:  Decrease lumbar extension by 50% noted.  Repeated test:  Flexion in standing: no reproducible s/s  Extension in standing: no reproducible s/s     AROM:  Right hip limited to 90 degrees with muscle guarding compared to left hip  Right hip pain noted with flexion, ER, and abduction     Palpation: Point tenderness to right GT/glut med region, proximal TFL and distal ITB       Right Left Comment   Hip Flexion: 4/5 4+/5     Hip ABD: 4-/5 4-/5     Hip ADD: 4-/5 4-/5     Hip Extension: 4-/5 4-/5     Knee Ext: 4+/5 5/5     Knee Flex: 4/5 4+/5         Manual: 15 minutes addressing right hip  Soft Tissue Palpation Assessment to determine the necessity for Functional Dry Needling  yes.   Patient provided written and verbal consent to FDN. yes  FDN performed to right lateral femur.     Home Exercises Provided and Patient Education Provided   Yes    Written Home Exercises Provided: Patient instructed to cont prior HEP.  Exercises were reviewed and Bertha was able to demonstrate them prior to the end of the session.  Bertha demonstrated good  understanding of the education provided.     See EMR under Patient Instructions for exercises provided prior visit.    Assessment   Patient demonstrated appropriate response to FDN. Yes  Patient with improved overall tissue mobility with decreased tissue tension and trigger points upon palpation of treated muscle groups after Functional Dry Needling.  Good  rhythmical contractions observed with estim to treated muscle groups       Bertha Is progressing well towards her goals.   Pt prognosis is Good.     Pt will continue to benefit from skilled outpatient physical therapy to address the deficits listed in the problem list box on initial evaluation, provide pt/family education and to maximize pt's level of independence in the home and community environment.     Pt's spiritual, cultural and educational needs considered and pt agreeable to plan of care and  "goals.    Anticipated barriers to physical therapy: none    Goals:   Short Term GOALS:  In 4 weeks, pt. will:  - improve hip MMT 1/2 grade for ambulatory purposes.  - improve lumbar/hip motion by 25% for mobility purposes.  - demonstrate bridge test up 30" for sit to stand purposes.  - ambulate > 150 ft independently with no 50% reduction in hip drop.  - decrease outcome measure limitation to <44%     Long Term GOALS:  In 8 weeks, pt. will:  - be independent and compliant with HEP and SX management   - decrease outcome measure limitation to <40%  - demonstrate hip MMT 4 or > for ambulatory purposes.  - demonstrate bridge test > 30" with no hip drop for stability motor control purposes.  - return to community ambulation independently with no painful limitations.     Plan     Continue with JONA Al, PT                "

## 2022-11-01 ENCOUNTER — CLINICAL SUPPORT (OUTPATIENT)
Dept: REHABILITATION | Facility: HOSPITAL | Age: 32
End: 2022-11-01
Attending: ORTHOPAEDIC SURGERY
Payer: COMMERCIAL

## 2022-11-01 DIAGNOSIS — R26.89 FUNCTIONAL GAIT ABNORMALITY: Primary | ICD-10-CM

## 2022-11-01 DIAGNOSIS — R29.898 WEAKNESS OF BOTH HIPS: ICD-10-CM

## 2022-11-01 PROCEDURE — 97140 MANUAL THERAPY 1/> REGIONS: CPT | Mod: PO | Performed by: PHYSICAL THERAPIST

## 2022-11-01 PROCEDURE — 97110 THERAPEUTIC EXERCISES: CPT | Mod: PO | Performed by: PHYSICAL THERAPIST

## 2022-11-01 NOTE — PROGRESS NOTES
Physical Therapy Daily Treatment Note     Name: Bertha Omalley [facility] Number: [id number]    Therapy Diagnosis:   Encounter Diagnoses   Name Primary?    Functional gait abnormality Yes    Weakness of both hips      Physician: Stanford Thomas MD    Visit Date: 11/1/2022  Physician Orders: PT Eval and Treat   Medical Diagnosis from Referral:   Greater trochanteric bursitis of right hip   Greater trochanteric bursitis of left hip   Tear of right gluteus medius tendon     S/P 09/02/2022   PROCEDURE:  1. Bilateral trochanteric bursectomy                            2. Piriformis tenotomy  Evaluation Date: 10/5/2022  Authorization Period Expiration: 09/29/2023  Plan of Care Expiration: 12/04/2022  Visit # / Visits authorized: 8     Time In: 0800  Time Out: 0900  Total Billable Time: 53 minutes     Precautions: Standard, WBAT    Subjective     Pt reports: overall doing better with walking and would like to address the left hip at this time. No new concerns and continues to work on hip/core strengthening.   She was compliant with home exercise program.  Response to previous treatment: muscle soreness  Functional change: walking with less of a limp    Pain: 1/10  Location: bilateral R>L     Objective     Bertha received therapeutic exercises to develop strength, endurance, ROM, flexibility, posture, and core stabilization for 30 minutes including:    Elliptical x 5 minutes  Standing GSS on slant board x 1'  Manual: hip abd stretch, leli axis distraction (R)  Supine: TA with blue band 2/10  Supine: TA with blue band, bridge 2/10, progressed to single leg bridge 2/10 each  S/L clam shells with blue band 2/10  Quadruped: alternating x 10 with TA    Shutte:   4.5 b 3/10  3b 3/10 each (SL)9  Mule kicks: 2/10 2b each    S/L hip abd/ER on sliding board 2/10 (R) with yellow band    Machine:  Leg press: 70# 3/10 DL, SL 30# 2/10 each  Hip abduction: 45# 2/10  Hip adduction: 30# 2/10 (NP)    Standing: hip abd walk with red  band x 2'    Previous: below  Posture: neutral     Lumbar screen:  Decrease lumbar extension by 50% noted.  Repeated test:  Flexion in standing: no reproducible s/s  Extension in standing: no reproducible s/s     AROM:  Right hip limited to 90 degrees with muscle guarding compared to left hip  Right hip pain noted with flexion, ER, and abduction     Palpation: Point tenderness to right GT/glut med region, proximal TFL and distal ITB       Right Left Comment   Hip Flexion: 4/5 4+/5     Hip ABD: 4-/5 4-/5     Hip ADD: 4-/5 4-/5     Hip Extension: 4-/5 4-/5     Knee Ext: 4+/5 5/5     Knee Flex: 4/5 4+/5         Manual: 23 minutes addressing right hip for joint mobility, STM/myofascial purposes.  -STM/myofascial to bilateral hip (lateral TFL to vastus lateralis )  Soft Tissue Palpation Assessment to determine the necessity for Functional Dry Needling  yes.   Patient provided written and verbal consent to FDN. yes  FDN performed to right lateral femur.     Home Exercises Provided and Patient Education Provided   Yes    Written Home Exercises Provided: Patient instructed to cont prior HEP.  Exercises were reviewed and Bertha was able to demonstrate them prior to the end of the session.  Bertha demonstrated good  understanding of the education provided.     See EMR under Patient Instructions for exercises provided prior visit.    Assessment   Patient demonstrated improvement with hip abduction evident from tolerance of yellow band up to 20 reps.  Patient demonstrated appropriate response to FDN. Yes  Patient with improved overall tissue mobility with decreased tissue tension and trigger points upon palpation of treated muscle groups after Functional Dry Needling.  Good  rhythmical contractions observed with estim to treated muscle groups       Bertha Is progressing well towards her goals.   Pt prognosis is Good.     Pt will continue to benefit from skilled outpatient physical therapy to address the deficits listed in  "the problem list box on initial evaluation, provide pt/family education and to maximize pt's level of independence in the home and community environment.     Pt's spiritual, cultural and educational needs considered and pt agreeable to plan of care and goals.    Anticipated barriers to physical therapy: none    Goals:   Short Term GOALS:  In 4 weeks, pt. will:  - improve hip MMT 1/2 grade for ambulatory purposes.  - improve lumbar/hip motion by 25% for mobility purposes.  - demonstrate bridge test up 30" for sit to stand purposes.  - ambulate > 150 ft independently with no 50% reduction in hip drop.  - decrease outcome measure limitation to <44%     Long Term GOALS:  In 8 weeks, pt. will:  - be independent and compliant with HEP and SX management   - decrease outcome measure limitation to <40%  - demonstrate hip MMT 4 or > for ambulatory purposes.  - demonstrate bridge test > 30" with no hip drop for stability motor control purposes.  - return to community ambulation independently with no painful limitations.     Plan     Continue with POC    Brendan Al, PT                  "

## 2022-11-03 ENCOUNTER — CLINICAL SUPPORT (OUTPATIENT)
Dept: REHABILITATION | Facility: HOSPITAL | Age: 32
End: 2022-11-03
Attending: ORTHOPAEDIC SURGERY
Payer: COMMERCIAL

## 2022-11-03 DIAGNOSIS — R26.89 FUNCTIONAL GAIT ABNORMALITY: Primary | ICD-10-CM

## 2022-11-03 DIAGNOSIS — R29.898 WEAKNESS OF BOTH HIPS: ICD-10-CM

## 2022-11-03 PROCEDURE — 97110 THERAPEUTIC EXERCISES: CPT | Mod: PO,97 | Performed by: PHYSICAL THERAPIST

## 2022-11-03 PROCEDURE — 97140 MANUAL THERAPY 1/> REGIONS: CPT | Mod: PO,97 | Performed by: PHYSICAL THERAPIST

## 2022-11-03 NOTE — PROGRESS NOTES
"  Physical Therapy Daily Treatment Note     [id number]    Therapy Diagnosis:   Encounter Diagnoses   Name Primary?    Functional gait abnormality Yes    Weakness of both hips      Physician: Stanford Thomas MD    Visit Date: 11/3/2022  Physician Orders: PT Eval and Treat   Medical Diagnosis from Referral:   Greater trochanteric bursitis of right hip   Greater trochanteric bursitis of left hip   Tear of right gluteus medius tendon     S/P 09/02/2022   PROCEDURE:  1. Bilateral trochanteric bursectomy                            2. Piriformis tenotomy  Evaluation Date: 10/5/2022  Authorization Period Expiration: 09/29/2023  Plan of Care Expiration: 12/04/2022  Visit # / Visits authorized: 9     Time In: 0800  Time Out: 0900  Total Billable Time: 50 minutes     Precautions: Standard, WBAT    Subjective     Pt reports: feeling better with no new s/s at this time.  She was compliant with home exercise program.  Response to previous treatment: muscle soreness  Functional change: walking with less of a limp    Pain: 1/10  Location: bilateral R>L     Objective     Bertha received therapeutic exercises to develop strength, endurance, ROM, flexibility, posture, and core stabilization for 30 minutes including:    Elliptical x 5 minutes  Standing GSS on slant board x 1'  Manual: hip abd stretch, elli axis distraction (R)  Supine: TA with blue band 2/10  Supine: TA with blue band, bridge 2/10, progressed to single leg bridge 2/10 each  S/L clam shells with blue band 2/10  Quadruped: alternating x 10 with TA  Added: lateral step downs 2/10 on 6" step  Fwd step ups on 6" step up x 20, alternating    Shutte:   4.5 b 3/10  3b 3/10 each (SL)9  Mule kicks: 2/10 2b each    S/L hip abd/ER on sliding board 2/10 (R) with yellow band    Machine:  Leg press: 70# 3/10 DL, SL 30# 2/10 each  Hip abduction: 45# 2/10  Hip adduction: 30# 2/10 (NP)    Standing: hip abd walk with red band x " 2'    Previous: below  Posture: neutral     Lumbar screen:  Decrease lumbar extension by 50% noted.  Repeated test:  Flexion in standing: no reproducible s/s  Extension in standing: no reproducible s/s     AROM:  Right hip limited to 90 degrees with muscle guarding compared to left hip  Right hip pain noted with flexion, ER, and abduction     Palpation: Point tenderness to right GT/glut med region, proximal TFL and distal ITB       Right Left Comment   Hip Flexion: 4/5 4+/5     Hip ABD: 4-/5 4-/5     Hip ADD: 4-/5 4-/5     Hip Extension: 4-/5 4-/5     Knee Ext: 4+/5 5/5     Knee Flex: 4/5 4+/5         Manual: 20 minutes addressing right hip for joint mobility, STM/myofascial purposes.  -STM/myofascial to bilateral hip (lateral TFL to vastus lateralis )  Soft Tissue Palpation Assessment to determine the necessity for Functional Dry Needling  yes.   Patient provided written and verbal consent to FDN. yes  FDN performed to right lateral femur.     Home Exercises Provided and Patient Education Provided   Yes    Written Home Exercises Provided: Patient instructed to cont prior HEP.  Exercises were reviewed and Bertha was able to demonstrate them prior to the end of the session.  Bertha demonstrated good  understanding of the education provided.     See EMR under Patient Instructions for exercises provided prior visit.    Assessment   Patient noted with left lateral hip point tenderness possibly from positional lying and responded well to manual therapy for inhibition/facilitation purposes.  Patient demonstrated appropriate response to FDN. Yes  Patient with improved overall tissue mobility with decreased tissue tension and trigger points upon palpation of treated muscle groups after Functional Dry Needling.  Good  rhythmical contractions observed with estim to treated muscle groups       Bertha Is progressing well towards her goals.   Pt prognosis is Good.     Pt will continue to benefit from skilled outpatient  "physical therapy to address the deficits listed in the problem list box on initial evaluation, provide pt/family education and to maximize pt's level of independence in the home and community environment.     Pt's spiritual, cultural and educational needs considered and pt agreeable to plan of care and goals.    Anticipated barriers to physical therapy: none    Goals:   Short Term GOALS:  In 4 weeks, pt. will:  - improve hip MMT 1/2 grade for ambulatory purposes.  - improve lumbar/hip motion by 25% for mobility purposes.  - demonstrate bridge test up 30" for sit to stand purposes.  - ambulate > 150 ft independently with no 50% reduction in hip drop.  - decrease outcome measure limitation to <44%     Long Term GOALS:  In 8 weeks, pt. will:  - be independent and compliant with HEP and SX management   - decrease outcome measure limitation to <40%  - demonstrate hip MMT 4 or > for ambulatory purposes.  - demonstrate bridge test > 30" with no hip drop for stability motor control purposes.  - return to community ambulation independently with no painful limitations.     Plan     Continue with POC    Brendan Al, PT                    "

## 2022-11-07 ENCOUNTER — CLINICAL SUPPORT (OUTPATIENT)
Dept: REHABILITATION | Facility: HOSPITAL | Age: 32
End: 2022-11-07
Attending: ORTHOPAEDIC SURGERY
Payer: COMMERCIAL

## 2022-11-07 DIAGNOSIS — R29.898 WEAKNESS OF BOTH HIPS: ICD-10-CM

## 2022-11-07 DIAGNOSIS — R26.89 FUNCTIONAL GAIT ABNORMALITY: Primary | ICD-10-CM

## 2022-11-07 PROCEDURE — 97140 MANUAL THERAPY 1/> REGIONS: CPT | Mod: PO | Performed by: PHYSICAL THERAPIST

## 2022-11-07 PROCEDURE — 97110 THERAPEUTIC EXERCISES: CPT | Mod: PO,97 | Performed by: PHYSICAL THERAPIST

## 2022-11-07 NOTE — PROGRESS NOTES
"  Physical Therapy Daily Treatment Note     [id number]    Therapy Diagnosis:   Encounter Diagnoses   Name Primary?    Functional gait abnormality Yes    Weakness of both hips        Physician: Stanford Thomas MD    Visit Date: 11/7/2022  Physician Orders: PT Eval and Treat   Medical Diagnosis from Referral:   Greater trochanteric bursitis of right hip   Greater trochanteric bursitis of left hip   Tear of right gluteus medius tendon     S/P 09/02/2022   PROCEDURE:  1. Bilateral trochanteric bursectomy                            2. Piriformis tenotomy  Evaluation Date: 10/5/2022  Authorization Period Expiration: 09/29/2023  Plan of Care Expiration: 12/04/2022  Visit # / Visits authorized: 9     Time In: 1255  Time Out: 1350  Total Billable Time: 50 minutes     Precautions: Standard, WBAT    Subjective     Pt reports: feeling better overall with left hip especially after last session and injections. No new s/s.  She was compliant with home exercise program.  Response to previous treatment: muscle soreness  Functional change: walking with less of a limp    Pain: 1/10  Location: bilateral R>L     Objective     Bertha received therapeutic exercises to develop strength, endurance, ROM, flexibility, posture, and core stabilization for 30 minutes including:    Elliptical x 5 minutes  Standing GSS on slant board x 1'  Manual: hip abd stretch, elli axis distraction (R)  Supine: TA with blue band 2/10  Supine: TA with blue band, bridge 2/10, progressed to single leg bridge 2/10 each  S/L clam shells with blue band 2/10  Quadruped: alternating x 10 with TA  Added: lateral step downs 2/10 on 6" step  Fwd step ups on 6" step up x 20, alternating    Added:  Clinician assisted SL-bridge 2/10 each  Short lever side planks 2/10 each    Shutte: NP  4.5 b 3/10  3b 3/10 each (SL)9  Mule kicks: 2/10 2b each    S/L hip abd/ER on sliding board 2/10 (R) with yellow band    Standing: hip abd walk " with red band x 2'    Previous: below  Posture: neutral     Lumbar screen:  Decrease lumbar extension by 50% noted.  Repeated test:  Flexion in standing: no reproducible s/s  Extension in standing: no reproducible s/s     AROM:  Right hip limited to 90 degrees with muscle guarding compared to left hip  Right hip pain noted with flexion, ER, and abduction     Palpation: Point tenderness to right GT/glut med region, proximal TFL and distal ITB       Right Left Comment   Hip Flexion: 4/5 4+/5     Hip ABD: 4-/5 4-/5     Hip ADD: 4-/5 4-/5     Hip Extension: 4-/5 4-/5     Knee Ext: 4+/5 5/5     Knee Flex: 4/5 4+/5         Manual: 20 minutes addressing right hip for joint mobility, STM/myofascial purposes.  -STM/myofascial to bilateral hip (lateral TFL to vastus lateralis )  Soft Tissue Palpation Assessment to determine the necessity for Functional Dry Needling  yes.   Patient provided written and verbal consent to FDN. yes  FDN performed to right lateral femur.     Home Exercises Provided and Patient Education Provided   Yes    Written Home Exercises Provided: Patient instructed to cont prior HEP.  Exercises were reviewed and Bertha was able to demonstrate them prior to the end of the session.  Bertha demonstrated good  understanding of the education provided.     See EMR under Patient Instructions for exercises provided prior visit.    Assessment   Patient noted with decreased hip pain to L >R side. Improvement in hip strength evident by gait. Patient demonstrated decreased stability motor control on right side compared to left side short lever side-planks.  Patient demonstrated appropriate response to FDN. Yes  Patient with improved overall tissue mobility with decreased tissue tension and trigger points upon palpation of treated muscle groups after Functional Dry Needling.  Good  rhythmical contractions observed with estim to treated muscle groups       Bertha Is progressing well towards her goals.   Pt  "prognosis is Good.     Pt will continue to benefit from skilled outpatient physical therapy to address the deficits listed in the problem list box on initial evaluation, provide pt/family education and to maximize pt's level of independence in the home and community environment.     Pt's spiritual, cultural and educational needs considered and pt agreeable to plan of care and goals.    Anticipated barriers to physical therapy: none    Goals:   Short Term GOALS:  In 4 weeks, pt. will:  - improve hip MMT 1/2 grade for ambulatory purposes. Ongoing  - improve lumbar/hip motion by 25% for mobility purposes. Met, 11/07/2022  - demonstrate bridge test up 30" for sit to stand purposes. Ongoing  - ambulate > 150 ft independently with no 50% reduction in hip drop. Met, 11/07/2022  - decrease outcome measure limitation to <44%     Long Term GOALS:  In 8 weeks, pt. will:  - be independent and compliant with HEP and SX management   - decrease outcome measure limitation to <40%  - demonstrate hip MMT 4 or > for ambulatory purposes.  - demonstrate bridge test > 30" with no hip drop for stability motor control purposes.  - return to community ambulation independently with no painful limitations.     Plan     Continue with JONA Al, PT                      "

## 2022-11-09 ENCOUNTER — CLINICAL SUPPORT (OUTPATIENT)
Dept: REHABILITATION | Facility: HOSPITAL | Age: 32
End: 2022-11-09
Attending: ORTHOPAEDIC SURGERY
Payer: COMMERCIAL

## 2022-11-09 DIAGNOSIS — R26.89 FUNCTIONAL GAIT ABNORMALITY: Primary | ICD-10-CM

## 2022-11-09 DIAGNOSIS — R29.898 WEAKNESS OF BOTH HIPS: ICD-10-CM

## 2022-11-09 PROCEDURE — 97140 MANUAL THERAPY 1/> REGIONS: CPT | Mod: PO,97 | Performed by: PHYSICAL THERAPIST

## 2022-11-09 PROCEDURE — 97110 THERAPEUTIC EXERCISES: CPT | Mod: PO | Performed by: PHYSICAL THERAPIST

## 2022-11-09 NOTE — PROGRESS NOTES
"  Physical Therapy Daily Treatment Note     [id number]    Therapy Diagnosis:   Encounter Diagnoses   Name Primary?    Functional gait abnormality Yes    Weakness of both hips        Physician: Stanford Thomas MD    Visit Date: 11/9/2022  Physician Orders: PT Eval and Treat   Medical Diagnosis from Referral:   Greater trochanteric bursitis of right hip   Greater trochanteric bursitis of left hip   Tear of right gluteus medius tendon     S/P 09/02/2022   PROCEDURE:  1. Bilateral trochanteric bursectomy                            2. Piriformis tenotomy  Evaluation Date: 10/5/2022  Authorization Period Expiration: 09/29/2023  Plan of Care Expiration: 12/04/2022  Visit # / Visits authorized: 10     Time In: 0825  Time Out: 0910  Total Billable Time: 38 minutes     Precautions: Standard, WBAT    Subjective     Pt reports: feeling better with no pain. Patient reported leaving on vacation this week.  She was compliant with home exercise program.  Response to previous treatment: muscle soreness  Functional change: walking with less of a limp    Pain: 0/10  Location: bilateral R>L     Objective     Bertha received therapeutic exercises to develop strength, endurance, ROM, flexibility, posture, and core stabilization for 30 minutes including:    Elliptical x 5 minutes  Standing GSS on slant board x 1'  Manual: hip abd stretch, elli axis distraction (R)  Supine: TA with blue band 2/10  Supine: TA with blue band, bridge 2/10, progressed to single leg bridge 2/10 each  S/L clam shells with blue band 2/10  Quadruped: alternating x 10 with TA  Added: lateral step downs 2/10 on 6" step  Fwd step ups on 6" step up x 20, alternating    Added:  Clinician assisted SL-bridge 2/10 each  Short lever side planks 2/10 each    Shutte: NP  4.5 b 3/10  3b 3/10 each (SL)9  Mule kicks: 2/10 2b each    S/L hip abd/ER on sliding board 2/10 (R) with yellow band    Standing: hip abd walk with red band x " 2'    Previous: below  Posture: neutral     Lumbar screen:  Decrease lumbar extension by 50% noted.  Repeated test:  Flexion in standing: no reproducible s/s  Extension in standing: no reproducible s/s     AROM:  Right hip limited to 90 degrees with muscle guarding compared to left hip  Right hip pain noted with flexion, ER, and abduction     Palpation: Point tenderness to right GT/glut med region, proximal TFL and distal ITB       Right Left Comment   Hip Flexion: 4/5 4+/5     Hip ABD: 4-/5 4-/5     Hip ADD: 4-/5 4-/5     Hip Extension: 4-/5 4-/5     Knee Ext: 4+/5 5/5     Knee Flex: 4/5 4+/5         Manual: 20 minutes addressing right hip for joint mobility, STM/myofascial purposes.  -STM/myofascial to bilateral hip (lateral TFL to vastus lateralis )  Soft Tissue Palpation Assessment to determine the necessity for Functional Dry Needling  yes.   Patient provided written and verbal consent to FDN. yes  FDN performed to right lateral femur.     Home Exercises Provided and Patient Education Provided   Yes    Written Home Exercises Provided: Patient instructed to cont prior HEP.  Exercises were reviewed and Bertha was able to demonstrate them prior to the end of the session.  Bertha demonstrated good  understanding of the education provided.     See EMR under Patient Instructions for exercises provided prior visit.    Assessment   Patient able to tolerated blue band resistance with no difficulty. Improvement in single leg bridge as well.  Patient demonstrated appropriate response to FDN. Yes  Patient with improved overall tissue mobility with decreased tissue tension and trigger points upon palpation of treated muscle groups after Functional Dry Needling.  Good  rhythmical contractions observed with estim to treated muscle groups       Bertha Is progressing well towards her goals.   Pt prognosis is Good.     Pt will continue to benefit from skilled outpatient physical therapy to address the deficits listed in  "the problem list box on initial evaluation, provide pt/family education and to maximize pt's level of independence in the home and community environment.     Pt's spiritual, cultural and educational needs considered and pt agreeable to plan of care and goals.    Anticipated barriers to physical therapy: none    Goals:   Short Term GOALS:  In 4 weeks, pt. will:  - improve hip MMT 1/2 grade for ambulatory purposes. Ongoing  - improve lumbar/hip motion by 25% for mobility purposes. Met, 11/07/2022  - demonstrate bridge test up 30" for sit to stand purposes. Ongoing  - ambulate > 150 ft independently with no 50% reduction in hip drop. Met, 11/07/2022  - decrease outcome measure limitation to <44%     Long Term GOALS:  In 8 weeks, pt. will:  - be independent and compliant with HEP and SX management   - decrease outcome measure limitation to <40%  - demonstrate hip MMT 4 or > for ambulatory purposes.  - demonstrate bridge test > 30" with no hip drop for stability motor control purposes.  - return to community ambulation independently with no painful limitations.     Plan     Continue with JONA Al, PT                        "

## 2022-11-22 RX ORDER — ALPRAZOLAM 0.5 MG/1
0.5 TABLET ORAL 3 TIMES DAILY PRN
Qty: 44 TABLET | Refills: 2 | Status: SHIPPED | OUTPATIENT
Start: 2022-11-22 | End: 2023-04-11 | Stop reason: SDUPTHER

## 2022-12-01 DIAGNOSIS — M70.61 GREATER TROCHANTERIC BURSITIS OF RIGHT HIP: Primary | ICD-10-CM

## 2022-12-01 DIAGNOSIS — M70.62 GREATER TROCHANTERIC BURSITIS OF LEFT HIP: ICD-10-CM

## 2022-12-01 DIAGNOSIS — M76.30 ILIOTIBIAL BAND SYNDROME, UNSPECIFIED LATERALITY: ICD-10-CM

## 2022-12-01 DIAGNOSIS — S76.011A TEAR OF RIGHT GLUTEUS MEDIUS TENDON, INITIAL ENCOUNTER: ICD-10-CM

## 2023-01-05 ENCOUNTER — OFFICE VISIT (OUTPATIENT)
Dept: OPTOMETRY | Facility: CLINIC | Age: 33
End: 2023-01-05
Payer: COMMERCIAL

## 2023-01-05 DIAGNOSIS — B30.9 VIRAL CONJUNCTIVITIS OF BOTH EYES: Primary | ICD-10-CM

## 2023-01-05 PROCEDURE — 99999 PR PBB SHADOW E&M-EST. PATIENT-LVL III: ICD-10-PCS | Mod: PBBFAC,,, | Performed by: OPTOMETRIST

## 2023-01-05 PROCEDURE — 99213 OFFICE O/P EST LOW 20 MIN: CPT | Mod: S$GLB,,, | Performed by: OPTOMETRIST

## 2023-01-05 PROCEDURE — 99213 PR OFFICE/OUTPT VISIT, EST, LEVL III, 20-29 MIN: ICD-10-PCS | Mod: S$GLB,,, | Performed by: OPTOMETRIST

## 2023-01-05 PROCEDURE — 1159F PR MEDICATION LIST DOCUMENTED IN MEDICAL RECORD: ICD-10-PCS | Mod: CPTII,S$GLB,, | Performed by: OPTOMETRIST

## 2023-01-05 PROCEDURE — 1159F MED LIST DOCD IN RCRD: CPT | Mod: CPTII,S$GLB,, | Performed by: OPTOMETRIST

## 2023-01-05 PROCEDURE — 99999 PR PBB SHADOW E&M-EST. PATIENT-LVL III: CPT | Mod: PBBFAC,,, | Performed by: OPTOMETRIST

## 2023-01-05 RX ORDER — NEOMYCIN SULFATE, POLYMYXIN B SULFATE AND DEXAMETHASONE 3.5; 10000; 1 MG/ML; [USP'U]/ML; MG/ML
1 SUSPENSION/ DROPS OPHTHALMIC 4 TIMES DAILY
Qty: 5 ML | Refills: 0 | Status: SHIPPED | OUTPATIENT
Start: 2023-01-05 | End: 2023-01-18

## 2023-01-05 NOTE — PATIENT INSTRUCTIONS
"DRY EYES -- BURNING OR KELIN SYMPTOMS:  Use Over The Counter artificial tears as needed for dry eye symptoms.   Some common brands include:  Systane, Optive, Refresh, and Thera-Tears.  These drops can be used as frequently as desired, but may be most helpful use during long periods of concentrated work.  For example, reading / working at the computer. Start with 3-4x per day.     Nighttime Ophthalmic gel or ointments are available: Refresh PM, Genteal, and Lacrilube.    Avoid drops that "get redness out" (Visine, Murine, Clear Eyes), as these may contain medication that could further irritate the eyes, especially with chronic use.    ALLERGY EYES -- ITCHING SYMPTOMS:  Over the counter medications include--Pataday, Zaditor, and Alaway.  Use as directed 1-2 drops daily for symptoms of itching / watering eyes.  These drops will not help for dry eye or exposure symptoms.    REDNESS RELIEF:  Lumify---is a good redness reliever that will not cause irritation if used chronically.          "

## 2023-01-05 NOTE — PROGRESS NOTES
HPI    Urgent care-conjunctivitis    Pt complains of OS watering, red, and painful x 4 days. Feels its going to   OD. Recently had a death in the family and was crying-was wearing new   contacts. Been wearing glasses. Tried ocuflox since Monday.  has   has eye issues lately-on and off since October. Was on pred and gel-unsure   what he had.   Last edited by Willa Strauss on 1/5/2023  9:01 AM.        ROS    Positive for: Eyes  Negative for: Constitutional, Gastrointestinal, Neurological, Skin,   Genitourinary, Musculoskeletal, HENT, Endocrine, Cardiovascular,   Respiratory, Psychiatric, Allergic/Imm, Heme/Lymph  Last edited by GUILLE Toribio, OD on 1/5/2023  9:13 AM.        Assessment /Plan     For exam results, see Encounter Report.    Viral conjunctivitis of both eyes  -     neomycin-polymyxin-dexamethasone (MAXITROL) 3.5mg/mL-10,000 unit/mL-0.1 % DrpS; Place 1 drop into both eyes 4 (four) times daily. for 7 days  Dispense: 5 mL; Refill: 0    No fever / sore throat/ no current URI   No keratitis noted today   Probable exposure from 3 yo 10 days prior w/ respiratory virus     Maxitrol as directed   ATs for comfort avoid vasocon type gtts  Lumify ok     Hand washing / hygiene, cautions     Discussed possible long taper if keratitis     Message with report in 3-4 days and f/u prn

## 2023-02-14 RX ORDER — AZITHROMYCIN 250 MG/1
TABLET, FILM COATED ORAL
Qty: 6 TABLET | Refills: 0 | Status: SHIPPED | OUTPATIENT
Start: 2023-02-14 | End: 2023-02-22

## 2023-03-01 ENCOUNTER — TELEPHONE (OUTPATIENT)
Dept: PHARMACY | Facility: CLINIC | Age: 33
End: 2023-03-01
Payer: COMMERCIAL

## 2023-03-01 RX ORDER — ONDANSETRON 8 MG/1
8 TABLET, ORALLY DISINTEGRATING ORAL EVERY 6 HOURS PRN
Qty: 30 TABLET | Refills: 3 | Status: SHIPPED | OUTPATIENT
Start: 2023-03-01 | End: 2024-03-28

## 2023-03-01 RX ORDER — SEMAGLUTIDE 1.34 MG/ML
1 INJECTION, SOLUTION SUBCUTANEOUS
Qty: 1 PEN | Refills: 2 | Status: SHIPPED | OUTPATIENT
Start: 2023-03-01 | End: 2023-05-24

## 2023-04-06 ENCOUNTER — OFFICE VISIT (OUTPATIENT)
Dept: OPTOMETRY | Facility: CLINIC | Age: 33
End: 2023-04-06
Payer: COMMERCIAL

## 2023-04-06 DIAGNOSIS — Z01.00 EXAMINATION OF EYES AND VISION: Primary | ICD-10-CM

## 2023-04-06 DIAGNOSIS — H52.13 MYOPIA, BILATERAL: ICD-10-CM

## 2023-04-06 DIAGNOSIS — H43.393 VITREOUS FLOATERS, BILATERAL: ICD-10-CM

## 2023-04-06 DIAGNOSIS — Z46.0 CONTACT LENS/GLASSES FITTING: ICD-10-CM

## 2023-04-06 DIAGNOSIS — Z46.0 CONTACT LENS/GLASSES FITTING: Primary | ICD-10-CM

## 2023-04-06 DIAGNOSIS — Z13.5 GLAUCOMA SCREENING: ICD-10-CM

## 2023-04-06 PROBLEM — Z97.3 WEARS CONTACT LENSES: Status: ACTIVE | Noted: 2023-04-06

## 2023-04-06 PROCEDURE — 99999 PR PBB SHADOW E&M-EST. PATIENT-LVL III: ICD-10-PCS | Mod: PBBFAC,,, | Performed by: OPTOMETRIST

## 2023-04-06 PROCEDURE — 92014 PR EYE EXAM, EST PATIENT,COMPREHESV: ICD-10-PCS | Mod: S$GLB,,, | Performed by: OPTOMETRIST

## 2023-04-06 PROCEDURE — 1159F MED LIST DOCD IN RCRD: CPT | Mod: CPTII,S$GLB,, | Performed by: OPTOMETRIST

## 2023-04-06 PROCEDURE — 99499 NO LOS: ICD-10-PCS | Mod: ,,, | Performed by: OPTOMETRIST

## 2023-04-06 PROCEDURE — 92015 PR REFRACTION: ICD-10-PCS | Mod: S$GLB,,, | Performed by: OPTOMETRIST

## 2023-04-06 PROCEDURE — 92014 COMPRE OPH EXAM EST PT 1/>: CPT | Mod: S$GLB,,, | Performed by: OPTOMETRIST

## 2023-04-06 PROCEDURE — 92310 PR CONTACT LENS FITTING (NO CHANGE): ICD-10-PCS | Mod: CSM,S$GLB,, | Performed by: OPTOMETRIST

## 2023-04-06 PROCEDURE — 92310 CONTACT LENS FITTING OU: CPT | Mod: CSM,S$GLB,, | Performed by: OPTOMETRIST

## 2023-04-06 PROCEDURE — 99499 UNLISTED E&M SERVICE: CPT | Mod: ,,, | Performed by: OPTOMETRIST

## 2023-04-06 PROCEDURE — 92015 DETERMINE REFRACTIVE STATE: CPT | Mod: S$GLB,,, | Performed by: OPTOMETRIST

## 2023-04-06 PROCEDURE — 1159F PR MEDICATION LIST DOCUMENTED IN MEDICAL RECORD: ICD-10-PCS | Mod: CPTII,S$GLB,, | Performed by: OPTOMETRIST

## 2023-04-06 PROCEDURE — 99999 PR PBB SHADOW E&M-EST. PATIENT-LVL III: CPT | Mod: PBBFAC,,, | Performed by: OPTOMETRIST

## 2023-04-06 NOTE — PATIENT INSTRUCTIONS
"DRY EYES -- BURNING OR KELIN SYMPTOMS:  Use Over The Counter artificial tears as needed for dry eye symptoms.   Some common brands include:  Systane, Optive, Refresh, and Thera-Tears.  These drops can be used as frequently as desired, but may be most helpful use during long periods of concentrated work.  For example, reading / working at the computer. Start with 3-4x per day.     Nighttime Ophthalmic gel or ointments are available: Refresh PM, Genteal, and Lacrilube.    Avoid drops that "get redness out" (Visine, Murine, Clear Eyes), as these may contain medication that could further irritate the eyes, especially with chronic use.    ALLERGY EYES -- ITCHING SYMPTOMS:  Over the counter medications include--Pataday, Zaditor, and Alaway.  Use as directed 1-2 drops daily for symptoms of itching / watering eyes.  These drops will not help for dry eye or exposure symptoms.    REDNESS RELIEF:  Lumify---is a good redness reliever that will not cause irritation if used chronically.        FLASHES / FLOATERS / POSTERIOR VITREOUS DETACHMENT    Call the clinic if you have any further changes in symptoms.  Including:  Increased numbers of floaters or flashing lights, dimness or darkness that moves through or stays constant in your vision, or any pain in the eye (s).    You may sometimes see small specks or clouds moving in your field of vision.  They are called FLOATERS.  You can often see them when looking at a plain background, like a blank wall or blue rose.  Floaters are actually tiny clumps of gel or cells inside the VITREOUS, the clear jelly-like fluid that fills the inside of your eye.    While these objects look like they are in front of your eye, they are actually floating inside.  What you see are the shadows they cast on the RETINA, the nerve layer at the back of the eye that senses light and allows you to see.      POSTERIOR VITREOUS DETACHMENT    The appearance of new floaters may be alarming.  If you suddenly " develop new floaters, you should contact your eye care professional  right away.    The retina can tear if the shrinking vitreous pulls away from the wall of the eye.  This sometimes causes a small amount of bleeding in the eye that may appear as new floaters.    A torn retina is always a serious problem, since it can lead to a retinal detachment.  You should see your eye care professional as soon as possible if:    even one new floater appears suddenly;  you see sudden flashes of light;  you notice other symptoms, like the loss of side vision, or a curtain closes down in your vision        POSTERIOR VITREOUS DETACHMENT is more common for people who:    are nearsighted;  have had cataract surgery;  have had YAG laser surgery of the eye;  have had inflammation inside the eye;  are over age 60.      While some floaters may remain visible, many of them will fade over time and become less noticeable.  Even if you've had some floaters for years, you should have your eyes checked as soon as possible if you notice new ones.    FLASHING LIGHTS    When the vitreous gel rubs or pulls on the retina, you may see what look like flashing lights or lightning streaks.  These flashes can appear off and on for several weeks or months.      Some people experience flashes of light that appear as jagged lines or heat waves in both eyes, lasting 10-20 minutes.  These flashes are caused by a spasm of blood vessels in the brain, which is called a migraine.    If a headache follows these flashes, it's called a migraine headache.  If   no headache occurs, these flashes are called Ophthalmic or Ocular Migraine.           DAILY WEAR CONTACT LENSES  Continue with Daily Wear of contact lenses, up to all waking hours.  Continue with approved contact lens disinfection / rewetting drops as discussed.  Dispose of lenses monthly / daily pending   Stop wearing your lenses and call our office if redness, blurred vision, or pain persists more than  12 hours.  We recommend an annual eye exam for contact lens patients.

## 2023-04-06 NOTE — PROGRESS NOTES
HPI    Dle- 04/06/2022    Pt here for eye exam and contact fitting. Pt sts changes to distance va,   pt likes current brand of contacts but would like to switch to dailies.   Floaters, occasional, nothing new, no increase. Denies flashes/eye pain.   No gtts.   Last edited by Rocío Klein MA on 4/6/2023  8:18 AM.        ROS    Negative for: Constitutional, Gastrointestinal, Neurological, Skin,   Genitourinary, Musculoskeletal, HENT, Endocrine, Cardiovascular, Eyes,   Respiratory, Psychiatric, Allergic/Imm, Heme/Lymph  Last edited by GUILLE Toribio, OD on 4/6/2023  8:43 AM.        Assessment /Plan     For exam results, see Encounter Report.    Examination of eyes and vision    Vitreous floaters, bilateral    Glaucoma screening    Myopia, bilateral    Contact lens/glasses fitting      Ocular health exam OU   RD precautions given and reviewed. Patient knows to call/ message if any further changes in symptoms occur.  Not suspect  Updated specs Rx, gave copy  Updated clrx dispense new trials // message with report then will finalize    DAILY WEAR CONTACT LENSES  Continue with Daily Wear of contact lenses, up to all waking hours.  Continue with approved contact lens disinfection / rewetting drops as discussed.  Dispose of lenses monthly / daily pending   Stop wearing your lenses and call our office if redness, blurred vision, or pain persists more than 12 hours.  We recommend an annual eye exam for contact lens patients.    Discussed and educated patient on current findings /plan.  RTC 1 year, prn if any changes / issues

## 2023-04-11 DIAGNOSIS — L03.211 CELLULITIS OF FACE: ICD-10-CM

## 2023-04-11 RX ORDER — ALPRAZOLAM 0.5 MG/1
0.5 TABLET ORAL 3 TIMES DAILY PRN
Qty: 44 TABLET | Refills: 2 | Status: SHIPPED | OUTPATIENT
Start: 2023-04-11 | End: 2023-10-16

## 2023-04-11 RX ORDER — MUPIROCIN 20 MG/G
OINTMENT TOPICAL 3 TIMES DAILY
Qty: 22 G | Refills: 3 | Status: SHIPPED | OUTPATIENT
Start: 2023-04-11 | End: 2024-03-28

## 2023-04-12 ENCOUNTER — PATIENT MESSAGE (OUTPATIENT)
Dept: OPTOMETRY | Facility: CLINIC | Age: 33
End: 2023-04-12
Payer: COMMERCIAL

## 2023-04-14 ENCOUNTER — PATIENT MESSAGE (OUTPATIENT)
Dept: OPTOMETRY | Facility: CLINIC | Age: 33
End: 2023-04-14
Payer: COMMERCIAL

## 2023-04-17 ENCOUNTER — TELEPHONE (OUTPATIENT)
Dept: OPTOMETRY | Facility: CLINIC | Age: 33
End: 2023-04-17
Payer: COMMERCIAL

## 2023-04-26 ENCOUNTER — PATIENT MESSAGE (OUTPATIENT)
Dept: OPTOMETRY | Facility: CLINIC | Age: 33
End: 2023-04-26
Payer: COMMERCIAL

## 2023-05-30 ENCOUNTER — CLINICAL SUPPORT (OUTPATIENT)
Dept: FAMILY MEDICINE | Facility: CLINIC | Age: 33
End: 2023-05-30
Payer: COMMERCIAL

## 2023-05-30 DIAGNOSIS — J02.9 SORE THROAT: Primary | ICD-10-CM

## 2023-05-30 DIAGNOSIS — J03.90 TONSILLITIS: Primary | ICD-10-CM

## 2023-05-30 DIAGNOSIS — R05.1 ACUTE COUGH: ICD-10-CM

## 2023-05-30 DIAGNOSIS — J32.9 SINUSITIS, UNSPECIFIED CHRONICITY, UNSPECIFIED LOCATION: Primary | ICD-10-CM

## 2023-05-30 LAB
CTP QC/QA: YES
MOLECULAR STREP A: NEGATIVE

## 2023-05-30 PROCEDURE — 87651 POCT STREP A MOLECULAR: ICD-10-PCS | Mod: QW,S$GLB,, | Performed by: ORTHOPAEDIC SURGERY

## 2023-05-30 PROCEDURE — 87651 STREP A DNA AMP PROBE: CPT | Mod: QW,S$GLB,, | Performed by: ORTHOPAEDIC SURGERY

## 2023-05-30 RX ORDER — PREDNISONE 20 MG/1
20 TABLET ORAL 2 TIMES DAILY
Qty: 10 TABLET | Refills: 0 | Status: SHIPPED | OUTPATIENT
Start: 2023-05-30 | End: 2023-06-04

## 2023-05-30 RX ORDER — PROMETHAZINE HYDROCHLORIDE AND DEXTROMETHORPHAN HYDROBROMIDE 6.25; 15 MG/5ML; MG/5ML
5 SYRUP ORAL EVERY 4 HOURS PRN
Qty: 118 ML | Refills: 0 | Status: SHIPPED | OUTPATIENT
Start: 2023-05-30 | End: 2023-06-09

## 2023-05-30 RX ORDER — CEFDINIR 300 MG/1
300 CAPSULE ORAL 2 TIMES DAILY
Qty: 14 CAPSULE | Refills: 0 | Status: SHIPPED | OUTPATIENT
Start: 2023-05-30 | End: 2023-06-06

## 2023-06-20 DIAGNOSIS — Z01.818 PRE-OP TESTING: Primary | ICD-10-CM

## 2023-06-21 ENCOUNTER — LAB VISIT (OUTPATIENT)
Dept: LAB | Facility: HOSPITAL | Age: 33
End: 2023-06-21
Attending: ORTHOPAEDIC SURGERY
Payer: COMMERCIAL

## 2023-06-21 DIAGNOSIS — Z01.818 PRE-OP TESTING: ICD-10-CM

## 2023-06-21 LAB
ALBUMIN SERPL BCP-MCNC: 3.9 G/DL (ref 3.5–5.2)
ALP SERPL-CCNC: 41 U/L (ref 55–135)
ALT SERPL W/O P-5'-P-CCNC: 11 U/L (ref 10–44)
ANION GAP SERPL CALC-SCNC: 8 MMOL/L (ref 8–16)
AST SERPL-CCNC: 13 U/L (ref 10–40)
BASOPHILS # BLD AUTO: 0.05 K/UL (ref 0–0.2)
BASOPHILS NFR BLD: 0.7 % (ref 0–1.9)
BILIRUB SERPL-MCNC: 0.9 MG/DL (ref 0.1–1)
BUN SERPL-MCNC: 13 MG/DL (ref 6–20)
CALCIUM SERPL-MCNC: 9.6 MG/DL (ref 8.7–10.5)
CHLORIDE SERPL-SCNC: 104 MMOL/L (ref 95–110)
CO2 SERPL-SCNC: 25 MMOL/L (ref 23–29)
CREAT SERPL-MCNC: 0.7 MG/DL (ref 0.5–1.4)
DIFFERENTIAL METHOD: ABNORMAL
EOSINOPHIL # BLD AUTO: 0.1 K/UL (ref 0–0.5)
EOSINOPHIL NFR BLD: 0.8 % (ref 0–8)
ERYTHROCYTE [DISTWIDTH] IN BLOOD BY AUTOMATED COUNT: 12.3 % (ref 11.5–14.5)
EST. GFR  (NO RACE VARIABLE): >60 ML/MIN/1.73 M^2
GLUCOSE SERPL-MCNC: 94 MG/DL (ref 70–110)
HCT VFR BLD AUTO: 38.8 % (ref 37–48.5)
HGB BLD-MCNC: 13.5 G/DL (ref 12–16)
IMM GRANULOCYTES # BLD AUTO: 0.02 K/UL (ref 0–0.04)
IMM GRANULOCYTES NFR BLD AUTO: 0.3 % (ref 0–0.5)
LYMPHOCYTES # BLD AUTO: 2.5 K/UL (ref 1–4.8)
LYMPHOCYTES NFR BLD: 33.4 % (ref 18–48)
MCH RBC QN AUTO: 32.5 PG (ref 27–31)
MCHC RBC AUTO-ENTMCNC: 34.8 G/DL (ref 32–36)
MCV RBC AUTO: 94 FL (ref 82–98)
MONOCYTES # BLD AUTO: 0.4 K/UL (ref 0.3–1)
MONOCYTES NFR BLD: 5.6 % (ref 4–15)
NEUTROPHILS # BLD AUTO: 4.4 K/UL (ref 1.8–7.7)
NEUTROPHILS NFR BLD: 59.2 % (ref 38–73)
NRBC BLD-RTO: 0 /100 WBC
PLATELET # BLD AUTO: 281 K/UL (ref 150–450)
PMV BLD AUTO: 10.1 FL (ref 9.2–12.9)
POTASSIUM SERPL-SCNC: 4.3 MMOL/L (ref 3.5–5.1)
PROT SERPL-MCNC: 7 G/DL (ref 6–8.4)
RBC # BLD AUTO: 4.15 M/UL (ref 4–5.4)
SODIUM SERPL-SCNC: 137 MMOL/L (ref 136–145)
WBC # BLD AUTO: 7.36 K/UL (ref 3.9–12.7)

## 2023-06-21 PROCEDURE — 36415 COLL VENOUS BLD VENIPUNCTURE: CPT | Mod: PO | Performed by: ORTHOPAEDIC SURGERY

## 2023-06-21 PROCEDURE — 80053 COMPREHEN METABOLIC PANEL: CPT | Performed by: ORTHOPAEDIC SURGERY

## 2023-06-21 PROCEDURE — 85025 COMPLETE CBC W/AUTO DIFF WBC: CPT | Performed by: ORTHOPAEDIC SURGERY

## 2023-07-25 ENCOUNTER — IMMUNIZATION (OUTPATIENT)
Dept: PHARMACY | Facility: CLINIC | Age: 33
End: 2023-07-25
Payer: COMMERCIAL

## 2023-08-04 DIAGNOSIS — Z02.1 DRUG SCREENING, PRE-EMPLOYMENT: Primary | ICD-10-CM

## 2023-08-08 ENCOUNTER — LAB VISIT (OUTPATIENT)
Dept: LAB | Facility: HOSPITAL | Age: 33
End: 2023-08-08
Attending: ORTHOPAEDIC SURGERY
Payer: COMMERCIAL

## 2023-08-08 DIAGNOSIS — Z02.1 DRUG SCREENING, PRE-EMPLOYMENT: ICD-10-CM

## 2023-08-08 DIAGNOSIS — Z02.1 DRUG SCREENING, PRE-EMPLOYMENT: Primary | ICD-10-CM

## 2023-08-08 LAB
AMPHET+METHAMPHET UR QL: NEGATIVE
BARBITURATES UR QL SCN>200 NG/ML: NEGATIVE
BENZODIAZ UR QL SCN>200 NG/ML: NEGATIVE
BZE UR QL SCN: NEGATIVE
CANNABINOIDS UR QL SCN: NEGATIVE
CREAT UR-MCNC: 209 MG/DL (ref 15–325)
ETHANOL UR-MCNC: <10 MG/DL
METHADONE UR QL SCN>300 NG/ML: NEGATIVE
OPIATES UR QL SCN: NEGATIVE
PCP UR QL SCN>25 NG/ML: NEGATIVE
TOXICOLOGY INFORMATION: NORMAL

## 2023-08-08 PROCEDURE — 80307 DRUG TEST PRSMV CHEM ANLYZR: CPT | Performed by: ORTHOPAEDIC SURGERY

## 2023-08-28 DIAGNOSIS — R58 ECCHYMOSIS: Primary | ICD-10-CM

## 2023-08-31 ENCOUNTER — LAB VISIT (OUTPATIENT)
Dept: LAB | Facility: HOSPITAL | Age: 33
End: 2023-08-31
Attending: ORTHOPAEDIC SURGERY
Payer: COMMERCIAL

## 2023-08-31 DIAGNOSIS — R58 ECCHYMOSIS: ICD-10-CM

## 2023-08-31 LAB
APTT PPP: 26.5 SEC (ref 21–32)
INR PPP: 0.9 (ref 0.8–1.2)
PROTHROMBIN TIME: 10.1 SEC (ref 9–12.5)

## 2023-08-31 PROCEDURE — 84270 ASSAY OF SEX HORMONE GLOBUL: CPT | Performed by: ORTHOPAEDIC SURGERY

## 2023-08-31 PROCEDURE — 84403 ASSAY OF TOTAL TESTOSTERONE: CPT | Performed by: ORTHOPAEDIC SURGERY

## 2023-08-31 PROCEDURE — 85610 PROTHROMBIN TIME: CPT | Mod: PO | Performed by: ORTHOPAEDIC SURGERY

## 2023-08-31 PROCEDURE — 85730 THROMBOPLASTIN TIME PARTIAL: CPT | Mod: PO | Performed by: ORTHOPAEDIC SURGERY

## 2023-09-07 LAB
ALBUMIN SERPL-MCNC: 4.1 G/DL (ref 3.6–5.1)
SHBG SERPL-SCNC: 89 NMOL/L (ref 17–124)
TESTOST FREE SERPL-MCNC: 0.4 PG/ML (ref 0.2–5)
TESTOST SERPL-MCNC: 7 NG/DL (ref 2–45)
TESTOSTERONE.FREE+WB SERPL-MCNC: 0.7 NG/DL (ref 0.5–8.5)

## 2023-09-14 RX ORDER — BENZONATATE 200 MG/1
200 CAPSULE ORAL 3 TIMES DAILY PRN
Qty: 60 CAPSULE | Refills: 0 | Status: SHIPPED | OUTPATIENT
Start: 2023-09-14 | End: 2023-10-14

## 2023-09-14 RX ORDER — AZITHROMYCIN 250 MG/1
TABLET, FILM COATED ORAL
Qty: 6 TABLET | Refills: 0 | Status: SHIPPED | OUTPATIENT
Start: 2023-09-14 | End: 2024-01-16

## 2023-10-16 RX ORDER — ALPRAZOLAM 0.5 MG/1
0.5 TABLET ORAL 3 TIMES DAILY PRN
Qty: 90 TABLET | Refills: 0 | Status: SHIPPED | OUTPATIENT
Start: 2023-10-16 | End: 2023-12-21 | Stop reason: SDUPTHER

## 2023-10-17 RX ORDER — ALPRAZOLAM 0.5 MG/1
0.5 TABLET ORAL 3 TIMES DAILY PRN
Qty: 44 TABLET | Refills: 2 | OUTPATIENT
Start: 2023-10-17 | End: 2023-11-30

## 2023-12-22 RX ORDER — FLUTICASONE PROPIONATE 0.5 MG/G
1 CREAM TOPICAL 2 TIMES DAILY
Qty: 60 G | Refills: 5 | Status: SHIPPED | OUTPATIENT
Start: 2023-12-22 | End: 2024-03-04

## 2023-12-22 RX ORDER — ALPRAZOLAM 0.5 MG/1
0.5 TABLET ORAL 3 TIMES DAILY PRN
Qty: 90 TABLET | Refills: 0 | Status: SHIPPED | OUTPATIENT
Start: 2023-12-22 | End: 2024-02-10

## 2024-02-05 RX ORDER — BALOXAVIR MARBOXIL 40 MG/1
40 TABLET, FILM COATED ORAL ONCE
Qty: 1 TABLET | Refills: 0 | Status: CANCELLED | OUTPATIENT
Start: 2024-02-05 | End: 2024-02-05

## 2024-02-27 ENCOUNTER — OFFICE VISIT (OUTPATIENT)
Dept: OPTOMETRY | Facility: CLINIC | Age: 34
End: 2024-02-27
Payer: COMMERCIAL

## 2024-02-27 ENCOUNTER — HOSPITAL ENCOUNTER (OUTPATIENT)
Dept: RADIOLOGY | Facility: HOSPITAL | Age: 34
Discharge: HOME | End: 2024-02-27
Attending: ORTHOPAEDIC SURGERY
Payer: COMMERCIAL

## 2024-02-27 DIAGNOSIS — R10.13 EPIGASTRIC PAIN: Primary | ICD-10-CM

## 2024-02-27 DIAGNOSIS — K81.9 CHOLECYSTITIS: Primary | ICD-10-CM

## 2024-02-27 DIAGNOSIS — H52.13 MYOPIA, BILATERAL: ICD-10-CM

## 2024-02-27 DIAGNOSIS — K80.10 CALCULUS OF GALLBLADDER WITH CHRONIC CHOLECYSTITIS WITHOUT OBSTRUCTION: Primary | ICD-10-CM

## 2024-02-27 DIAGNOSIS — H43.393 VITREOUS FLOATERS, BILATERAL: ICD-10-CM

## 2024-02-27 DIAGNOSIS — K81.9 CHOLECYSTITIS: ICD-10-CM

## 2024-02-27 DIAGNOSIS — Z46.0 CONTACT LENS/GLASSES FITTING: ICD-10-CM

## 2024-02-27 DIAGNOSIS — Z01.00 EXAMINATION OF EYES AND VISION: Primary | ICD-10-CM

## 2024-02-27 DIAGNOSIS — Z46.0 CONTACT LENS/GLASSES FITTING: Primary | ICD-10-CM

## 2024-02-27 PROCEDURE — 99999 PR PBB SHADOW E&M-EST. PATIENT-LVL III: CPT | Mod: PBBFAC,,, | Performed by: OPTOMETRIST

## 2024-02-27 PROCEDURE — 76705 ECHO EXAM OF ABDOMEN: CPT | Mod: TC,PO

## 2024-02-27 PROCEDURE — 92015 DETERMINE REFRACTIVE STATE: CPT | Mod: S$GLB,,, | Performed by: OPTOMETRIST

## 2024-02-27 PROCEDURE — 92310 CONTACT LENS FITTING OU: CPT | Mod: CSM,S$GLB,, | Performed by: OPTOMETRIST

## 2024-02-27 PROCEDURE — 76705 ECHO EXAM OF ABDOMEN: CPT | Mod: 26,,, | Performed by: RADIOLOGY

## 2024-02-27 PROCEDURE — 1159F MED LIST DOCD IN RCRD: CPT | Mod: CPTII,S$GLB,, | Performed by: OPTOMETRIST

## 2024-02-27 PROCEDURE — 92012 INTRM OPH EXAM EST PATIENT: CPT | Mod: S$GLB,,, | Performed by: OPTOMETRIST

## 2024-02-27 PROCEDURE — 99499 UNLISTED E&M SERVICE: CPT | Mod: ,,, | Performed by: OPTOMETRIST

## 2024-02-27 NOTE — PATIENT INSTRUCTIONS
"DRY EYES -- BURNING OR KELIN SYMPTOMS:  Use Over The Counter artificial tears as needed for dry eye symptoms.   Some common brands include:  Systane, Optive, Refresh, and Thera-Tears.  These drops can be used as frequently as desired, but may be most helpful use during long periods of concentrated work.  For example, reading / working at the computer. Start with 3-4x per day.     Nighttime Ophthalmic gel or ointments are available: Refresh PM, Genteal, and Lacrilube.    Avoid drops that "get redness out" (Visine, Murine, Clear Eyes), as these may contain medication that could further irritate the eyes, especially with chronic use.    ALLERGY EYES -- ITCHING SYMPTOMS:  Over the counter medications include--Pataday, Zaditor, and Alaway.  Use as directed 1-2 drops daily for symptoms of itching / watering eyes.  These drops will not help for dry eye or exposure symptoms.    REDNESS RELIEF:  Lumify---is a good redness reliever that will not cause irritation if used chronically.       FLASHES / FLOATERS / POSTERIOR VITREOUS DETACHMENT    Call the clinic if you have any further changes in symptoms.  Including:  Increased numbers of floaters or flashing lights, dimness or darkness that moves through or stays constant in your vision, or any pain in the eye (s).    You may sometimes see small specks or clouds moving in your field of vision.  They are called FLOATERS.  You can often see them when looking at a plain background, like a blank wall or blue rose.  Floaters are actually tiny clumps of gel or cells inside the VITREOUS, the clear jelly-like fluid that fills the inside of your eye.    While these objects look like they are in front of your eye, they are actually floating inside.  What you see are the shadows they cast on the RETINA, the nerve layer at the back of the eye that senses light and allows you to see.      POSTERIOR VITREOUS DETACHMENT    The appearance of new floaters may be alarming.  If you suddenly develop " new floaters, you should contact your eye care professional  right away.    The retina can tear if the shrinking vitreous pulls away from the wall of the eye.  This sometimes causes a small amount of bleeding in the eye that may appear as new floaters.    A torn retina is always a serious problem, since it can lead to a retinal detachment.  You should see your eye care professional as soon as possible if:    even one new floater appears suddenly;  you see sudden flashes of light;  you notice other symptoms, like the loss of side vision, or a curtain closes down in your vision        POSTERIOR VITREOUS DETACHMENT is more common for people who:    are nearsighted;  have had cataract surgery;  have had YAG laser surgery of the eye;  have had inflammation inside the eye;  are over age 60.      While some floaters may remain visible, many of them will fade over time and become less noticeable.  Even if you've had some floaters for years, you should have your eyes checked as soon as possible if you notice new ones.    FLASHING LIGHTS    When the vitreous gel rubs or pulls on the retina, you may see what look like flashing lights or lightning streaks.  These flashes can appear off and on for several weeks or months.      Some people experience flashes of light that appear as jagged lines or heat waves in both eyes, lasting 10-20 minutes.  These flashes are caused by a spasm of blood vessels in the brain, which is called a migraine.    If a headache follows these flashes, it's called a migraine headache.  If   no headache occurs, these flashes are called Ophthalmic or Ocular Migraine.          DAILY WEAR CONTACT LENSES  Continue with Daily Wear of contact lenses, up to all waking hours.  Continue with approved contact lens disinfection / rewetting drops as discussed.  Dispose of lenses daily.   Stop wearing your lenses and call our office if redness, blurred vision, or pain persists more than 12 hours.  We recommend an  annual eye exam for contact lens patients.

## 2024-02-27 NOTE — PROGRESS NOTES
HPI    Pt here for routine exam and contact exam SUZANNE 04/06/23      Pt denies any changes in VA w lenses, needs to update spec Rx. Pt denies   flashes and floaters. Pt using AT PRN.   Last edited by Lizett Leigh on 2/27/2024  8:10 AM.            Assessment /Plan     For exam results, see Encounter Report.    Examination of eyes and vision    Myopia, bilateral    Contact lens/glasses fitting    Vitreous floaters, bilateral      Ocular health exam OU   Defer DFE --- OPTOS 2/2024--normal   2.   Updated specs rx, gave copy  3.   Updated and gave new trials, slight decrease OS --message with report, then can finalize  4.   Moderate myopia ---RD precautions given and reviewed. Patient knows to call/ message if any further changes in symptoms occur.    Use otc ATs for dry eye relief    DAILY WEAR CONTACT LENSES  Continue with Daily Wear of contact lenses, up to all waking hours.  Continue with approved contact lens disinfection / rewetting drops as discussed.  Dispose of lenses daily.  Stop wearing your lenses and call our office if redness, blurred vision, or pain persists more than 12 hours.  We recommend an annual eye exam for contact lens patients.    Discussed and educated patient on current findings /plan.  RTC 1 year, prn if any changes / issues

## 2024-02-28 ENCOUNTER — LAB VISIT (OUTPATIENT)
Dept: LAB | Facility: HOSPITAL | Age: 34
End: 2024-02-28
Attending: INTERNAL MEDICINE
Payer: COMMERCIAL

## 2024-02-28 DIAGNOSIS — R10.9 ABDOMINAL PAIN: Primary | ICD-10-CM

## 2024-02-28 LAB
ALBUMIN SERPL BCP-MCNC: 3.8 G/DL (ref 3.5–5.2)
ALP SERPL-CCNC: 51 U/L (ref 55–135)
ALT SERPL W/O P-5'-P-CCNC: 11 U/L (ref 10–44)
ANION GAP SERPL CALC-SCNC: 7 MMOL/L (ref 8–16)
AST SERPL-CCNC: 13 U/L (ref 10–40)
BASOPHILS # BLD AUTO: 0.04 K/UL (ref 0–0.2)
BASOPHILS NFR BLD: 0.7 % (ref 0–1.9)
BILIRUB SERPL-MCNC: 1.1 MG/DL (ref 0.1–1)
BUN SERPL-MCNC: 13 MG/DL (ref 6–20)
CALCIUM SERPL-MCNC: 9.8 MG/DL (ref 8.7–10.5)
CHLORIDE SERPL-SCNC: 105 MMOL/L (ref 95–110)
CO2 SERPL-SCNC: 25 MMOL/L (ref 23–29)
CREAT SERPL-MCNC: 0.7 MG/DL (ref 0.5–1.4)
DIFFERENTIAL METHOD BLD: ABNORMAL
EOSINOPHIL # BLD AUTO: 0 K/UL (ref 0–0.5)
EOSINOPHIL NFR BLD: 0.3 % (ref 0–8)
ERYTHROCYTE [DISTWIDTH] IN BLOOD BY AUTOMATED COUNT: 12.8 % (ref 11.5–14.5)
EST. GFR  (NO RACE VARIABLE): >60 ML/MIN/1.73 M^2
GLUCOSE SERPL-MCNC: 96 MG/DL (ref 70–110)
HCT VFR BLD AUTO: 38.1 % (ref 37–48.5)
HGB BLD-MCNC: 12.8 G/DL (ref 12–16)
IMM GRANULOCYTES # BLD AUTO: 0.01 K/UL (ref 0–0.04)
IMM GRANULOCYTES NFR BLD AUTO: 0.2 % (ref 0–0.5)
LYMPHOCYTES # BLD AUTO: 2.2 K/UL (ref 1–4.8)
LYMPHOCYTES NFR BLD: 36.5 % (ref 18–48)
MCH RBC QN AUTO: 31.4 PG (ref 27–31)
MCHC RBC AUTO-ENTMCNC: 33.6 G/DL (ref 32–36)
MCV RBC AUTO: 93 FL (ref 82–98)
MONOCYTES # BLD AUTO: 0.4 K/UL (ref 0.3–1)
MONOCYTES NFR BLD: 7.1 % (ref 4–15)
NEUTROPHILS # BLD AUTO: 3.3 K/UL (ref 1.8–7.7)
NEUTROPHILS NFR BLD: 55.2 % (ref 38–73)
NRBC BLD-RTO: 0 /100 WBC
PLATELET # BLD AUTO: 303 K/UL (ref 150–450)
PMV BLD AUTO: 10.3 FL (ref 9.2–12.9)
POTASSIUM SERPL-SCNC: 4.1 MMOL/L (ref 3.5–5.1)
PROT SERPL-MCNC: 7.2 G/DL (ref 6–8.4)
RBC # BLD AUTO: 4.08 M/UL (ref 4–5.4)
SODIUM SERPL-SCNC: 137 MMOL/L (ref 136–145)
WBC # BLD AUTO: 5.92 K/UL (ref 3.9–12.7)

## 2024-02-28 PROCEDURE — 36415 COLL VENOUS BLD VENIPUNCTURE: CPT | Mod: PO | Performed by: INTERNAL MEDICINE

## 2024-02-28 PROCEDURE — 85025 COMPLETE CBC W/AUTO DIFF WBC: CPT | Performed by: INTERNAL MEDICINE

## 2024-02-28 PROCEDURE — 80053 COMPREHEN METABOLIC PANEL: CPT | Performed by: INTERNAL MEDICINE

## 2024-02-29 ENCOUNTER — HOSPITAL ENCOUNTER (OUTPATIENT)
Dept: RADIOLOGY | Facility: HOSPITAL | Age: 34
Discharge: HOME | End: 2024-02-29
Attending: ORTHOPAEDIC SURGERY
Payer: COMMERCIAL

## 2024-02-29 DIAGNOSIS — R10.13 EPIGASTRIC PAIN: ICD-10-CM

## 2024-02-29 PROCEDURE — 74177 CT ABD & PELVIS W/CONTRAST: CPT | Mod: TC,PO

## 2024-02-29 PROCEDURE — 25500020 PHARM REV CODE 255: Mod: PO | Performed by: ORTHOPAEDIC SURGERY

## 2024-02-29 PROCEDURE — 74177 CT ABD & PELVIS W/CONTRAST: CPT | Mod: 26,,, | Performed by: RADIOLOGY

## 2024-02-29 PROCEDURE — A9698 NON-RAD CONTRAST MATERIALNOC: HCPCS | Mod: PO | Performed by: ORTHOPAEDIC SURGERY

## 2024-02-29 RX ADMIN — IOHEXOL 75 ML: 350 INJECTION, SOLUTION INTRAVENOUS at 09:02

## 2024-02-29 RX ADMIN — IOHEXOL 1000 ML: 12 SOLUTION ORAL at 09:02

## 2024-03-12 ENCOUNTER — PATIENT MESSAGE (OUTPATIENT)
Dept: OPTOMETRY | Facility: CLINIC | Age: 34
End: 2024-03-12
Payer: COMMERCIAL

## 2024-04-04 RX ORDER — AZITHROMYCIN 250 MG/1
TABLET, FILM COATED ORAL
Qty: 6 TABLET | Refills: 0 | Status: SHIPPED | OUTPATIENT
Start: 2024-04-04 | End: 2024-04-09

## 2024-04-22 ENCOUNTER — PATIENT MESSAGE (OUTPATIENT)
Dept: OPTOMETRY | Facility: CLINIC | Age: 34
End: 2024-04-22
Payer: COMMERCIAL

## 2024-05-09 RX ORDER — METHOCARBAMOL 750 MG/1
750 TABLET, FILM COATED ORAL 4 TIMES DAILY PRN
Qty: 44 TABLET | Refills: 3 | Status: SHIPPED | OUTPATIENT
Start: 2024-05-09 | End: 2024-06-18

## 2024-05-21 ENCOUNTER — OFFICE VISIT (OUTPATIENT)
Dept: OTOLARYNGOLOGY | Facility: CLINIC | Age: 34
End: 2024-05-21
Payer: COMMERCIAL

## 2024-05-21 VITALS — HEIGHT: 65 IN | BODY MASS INDEX: 24.1 KG/M2 | WEIGHT: 144.63 LBS

## 2024-05-21 DIAGNOSIS — J31.0 RHINITIS, UNSPECIFIED TYPE: ICD-10-CM

## 2024-05-21 DIAGNOSIS — K21.9 LARYNGOPHARYNGEAL REFLUX (LPR): ICD-10-CM

## 2024-05-21 DIAGNOSIS — R09.A2 GLOBUS SENSATION: Primary | ICD-10-CM

## 2024-05-21 PROCEDURE — 1159F MED LIST DOCD IN RCRD: CPT | Mod: CPTII,S$GLB,, | Performed by: OTOLARYNGOLOGY

## 2024-05-21 PROCEDURE — 3008F BODY MASS INDEX DOCD: CPT | Mod: CPTII,S$GLB,, | Performed by: OTOLARYNGOLOGY

## 2024-05-21 PROCEDURE — 99999 PR PBB SHADOW E&M-EST. PATIENT-LVL III: CPT | Mod: PBBFAC,,, | Performed by: OTOLARYNGOLOGY

## 2024-05-21 PROCEDURE — 99203 OFFICE O/P NEW LOW 30 MIN: CPT | Mod: 25,S$GLB,, | Performed by: OTOLARYNGOLOGY

## 2024-05-21 PROCEDURE — 31575 DIAGNOSTIC LARYNGOSCOPY: CPT | Mod: S$GLB,,, | Performed by: OTOLARYNGOLOGY

## 2024-05-21 PROCEDURE — 1160F RVW MEDS BY RX/DR IN RCRD: CPT | Mod: CPTII,S$GLB,, | Performed by: OTOLARYNGOLOGY

## 2024-05-21 RX ORDER — PANTOPRAZOLE SODIUM 40 MG/1
40 TABLET, DELAYED RELEASE ORAL DAILY
Qty: 30 TABLET | Refills: 11 | Status: SHIPPED | OUTPATIENT
Start: 2024-05-21 | End: 2024-06-18

## 2024-05-21 NOTE — PATIENT INSTRUCTIONS
"Oral Anti-histamine    Take an oral anti-histamine daily for control of nasal and/or eye symptoms. Depending on the dosage listed on the bottle, these medications are once or twice daily. They control allergy and sinus issues. Examples include Allegra, Zyrtec, Claritin, Xyzal as well as the generic names for each.     Be sure to look out for combination medications that contain a decongestant as well. Many times, the bottle will have a " -D " at the end of the name. I would prefer if you did not take the combination medication regularly. Stick to the single medication for control of daily symptoms and use a separate decongestant as needed for days where you feel more congested.    Common side effects of anti-histamines include: dry mouth and dry secretions, dry eyes, occasional drowsiness, constipation. I recommend drinking plenty of water while on this medication.     LARYNGOPHARYNGEAL REFLUX  (SILENT OR ATYPICAL REFLUX)    If you have any of the following symptoms you may have laryngopharyngeal reflux (LPR):  hoarseness, thick or too much mucus, chronic throat pain/irritation, chronic throat clearing, chronic cough, especially cough that wake you up from sleep, chronic "postnasal drip" without the need to blow your nose.     LPR is also called extra esophageal reflux. This means that if you have reflux into the tissues above the esophagus (into the voicebox,) you may experience throat issues as above.     Many people with LPR do not have symptoms of heartburn. Compared to the esophagus, the voice box and the back of the throat are significantly more sensitive to the effects of acid and digestive enzymes on surrounding tissue. Acid passing quickly through the esophagus does not have a chance to irritate the area for too long.  However acid that pools in the throat or voice box can cause prolonged irritation resulting in the symptoms of LPR.    The symptoms of LPR can consist of a dry cough and the sensation of " "something being stuck in the throat.  Some people will complain of heartburn while others may have intermittent hoarseness or loss of voice.  Another major symptom of LPR is "postnasal drip."  Patients are often told symptoms are due to abnormal nasal drainage or sinus infection; however this is rarely the cause of chronic throat irritation. For post nasal drip to cause the complaints described, signs and symptoms of an active nasal infection should be present.     Treatments for LPR include: postural changes (sleeping or laying with an incline), weight reduction, diet modification, medication to reduce stomach acid and promote normal motility, and rarely: surgery to prevent reflux. Most patients will begin to notice some relief in her symptoms about 2-4 weeks after starting the medication; however it is generally recommended the medication should be continued for at least 2 months. If the symptoms completely resolve, the medication can then be tapered.  Some people will remain symptom free while others may have relapses which required treatment again.    Things you may be able to do to prevent reflux.  1. Do not smoke.  Smoking will worsen reflux.    2. Avoid eating at least 2-3 hours prior to bedtime.  Try to avoid very large meals at night.    4. Weight loss.  For patient's with recent weight gain, shedding a few pounds is all that is required to improve reflux.    5. Avoid reflux triggers. These can worsen acid in the stomach or even cause the esophagus muscles to relax: caffeine, soft drinks, acidic foods (tomato, lots of fruit) mints, alcoholic beverages, particularly at night, cheese, fried foods, spicy foods, eggs, and chocolate.    6. Sleep with the head of bed elevated at least 6 inches.  7. You may also be prescribed a medication, or a medication you take may also be increased. Dr. Corbin will discuss this with you.    "

## 2024-05-21 NOTE — PROGRESS NOTES
Subjective:       Patient ID: Bertha Ochoa is a 34 y.o. female.    Chief Complaint: globus sensation    Bertha is here for globus.   Length of symptoms: 3 weeks.  She began taking PPI 40 mg for 1.5 weeks maybe had a little improvement but then worsened again. She denies heartburn symptoms regularly, but was having epigastric discomfort that resulted in gallbladder removal. She does belch more. Reports good hydration. No otalgia. No dysphonia. No previous throat surgeries.   Does have mild AR issues. No reg meds    Patient validated questionnaires (if applicable):      %            No data to display                   No data to display                   No data to display                     Social History     Tobacco Use   Smoking Status Never    Passive exposure: Never   Smokeless Tobacco Never     Social History     Substance and Sexual Activity   Alcohol Use No          Objective:        Constitutional:   She is oriented to person, place, and time. She appears well-developed and well-nourished. She appears alert. She is active. Normal speech.      Head:  Normocephalic and atraumatic. Head is without TMJ tenderness. No scars. Salivary glands normal.  Facial strength is normal.      Ears:    Right Ear: No drainage or swelling. No middle ear effusion.   Left Ear: No drainage or swelling.  No middle ear effusion.     Nose:  No mucosal edema, rhinorrhea or sinus tenderness. No turbinate hypertrophy.      Mouth/Throat  Oropharynx clear and moist without lesions or asymmetry, normal uvula midline and mirror exam normal. Normal dentition. No uvula swelling, lacerations or trismus. No oropharyngeal exudate. Tonsils present (1-2+).  Tonsillar erythema, tonsillar exudate.    Thick mucous on hypopharynx - unable to see cords entirely -  flex indicated to evaluate      Neck:  Full range of motion with neck supple and no adenopathy. Thyroid tenderness is present. No tracheal deviation, no edema, no erythema,  normal range of motion, no stridor, no crepitus and no neck rigidity present. No thyroid mass present.     Cardiovascular:    Intact distal pulses and normal pulses.              Pulmonary/Chest:   Effort normal and breath sounds normal. No stridor.     Psychiatric:   Her speech is normal and behavior is normal. Her mood appears not anxious. Her affect is not labile.     Neurological:   She is alert and oriented to person, place, and time. No sensory deficit.     Skin:   No abrasions, lacerations, lesions, or rashes. No abrasion and no bruising noted.         Tests / Results:  CT A/D:  Impression:     1. 3 mm right renal calcifications suggestive a nonobstructive stone.  2. Gas within the distal esophagus as can be seen with air swallowing or reflux      Pre-procedure diagnosis: The primary encounter diagnosis was Globus sensation. Diagnoses of Laryngopharyngeal reflux (LPR) and Rhinitis, unspecified type were also pertinent to this visit.     Post-procedure diagnosis: same    Procedure: Flexible fiberoptic laryngoscopy    Surgeon: Anurag Corbin MD    Anesthesia: 4% Lidocaine with 0.25% Phenylephrine topical    Risks, benefits, and alternatives of the procedure were discussed with the patient, and the patient consented to the fiberoptic examination.  We applied a topical nasal decongestant and analgesic.  After adequate anesthesia was obtained, the flexible fiberoptic scope was passed through the nasal cavity. The entire pharynx (nasopharynx to hypopharynx) and the larynx were visualized. At the end of the examination, the scope was removed. The patient tolerated the procedure well with no complications.     Findings:  -     Laryngeal mucosa is normal  -     Post-cricoid region: moderate PCE with clear mucous film overlying hypopharyngeal inlet   -     Lingual tonsils have Grade 2 hypertrophy  -     Adenoids have NO  hypertrophy  -     Right vocal fold: normal mobility     mass/lesion: none  -     Left vocal  fold: normal mobility     mass/lesion: none  -     Other findings: rhinitis changes with clear mucous in L MM and nasopharynx    Assessment:       1. Globus sensation    2. Laryngopharyngeal reflux (LPR)    3. Rhinitis, unspecified type          Plan:         Discussed reflux (LPR) and poss rhinitis causing symptoms  PPI regularly for 3-12 months then wean as needed  Oral AH regularly for now, can continue seasonally or longterm  Notify if persistent concerns

## 2024-06-07 RX ORDER — METHYLPREDNISOLONE 4 MG/1
TABLET ORAL
Qty: 21 EACH | Refills: 0 | Status: SHIPPED | OUTPATIENT
Start: 2024-06-07 | End: 2024-06-28

## 2024-06-11 ENCOUNTER — HOSPITAL ENCOUNTER (OUTPATIENT)
Dept: RADIOLOGY | Facility: HOSPITAL | Age: 34
Discharge: HOME | End: 2024-06-11
Attending: NURSE PRACTITIONER
Payer: COMMERCIAL

## 2024-06-11 DIAGNOSIS — J06.9 ACUTE RESPIRATORY DISEASE: ICD-10-CM

## 2024-06-11 PROCEDURE — 71046 X-RAY EXAM CHEST 2 VIEWS: CPT | Mod: TC,PO

## 2024-06-11 PROCEDURE — 71046 X-RAY EXAM CHEST 2 VIEWS: CPT | Mod: 26,,, | Performed by: RADIOLOGY

## 2024-06-14 DIAGNOSIS — R06.02 SHORTNESS OF BREATH: Primary | ICD-10-CM

## 2024-06-14 DIAGNOSIS — J45.30 MILD PERSISTENT ASTHMA, UNSPECIFIED WHETHER COMPLICATED: ICD-10-CM

## 2024-06-17 DIAGNOSIS — R13.10 DYSPHAGIA, UNSPECIFIED TYPE: Primary | ICD-10-CM

## 2024-06-17 PROBLEM — J45.30 MILD PERSISTENT ASTHMA: Status: ACTIVE | Noted: 2024-06-17

## 2024-06-17 PROBLEM — R06.02 SHORTNESS OF BREATH: Status: ACTIVE | Noted: 2024-06-17

## 2024-06-18 ENCOUNTER — OFFICE VISIT (OUTPATIENT)
Dept: GASTROENTEROLOGY | Facility: CLINIC | Age: 34
End: 2024-06-18
Payer: COMMERCIAL

## 2024-06-18 VITALS — HEIGHT: 65 IN | WEIGHT: 144.63 LBS | RESPIRATION RATE: 17 BRPM | BODY MASS INDEX: 24.1 KG/M2

## 2024-06-18 DIAGNOSIS — Z90.49 S/P CHOLECYSTECTOMY: ICD-10-CM

## 2024-06-18 DIAGNOSIS — R13.19 ESOPHAGEAL DYSPHAGIA: ICD-10-CM

## 2024-06-18 DIAGNOSIS — R07.89 CHEST PRESSURE: Primary | ICD-10-CM

## 2024-06-18 DIAGNOSIS — R09.A2 GLOBUS SENSATION: ICD-10-CM

## 2024-06-18 PROCEDURE — 99999 PR PBB SHADOW E&M-EST. PATIENT-LVL IV: CPT | Mod: PBBFAC,,, | Performed by: NURSE PRACTITIONER

## 2024-06-18 PROCEDURE — 99214 OFFICE O/P EST MOD 30 MIN: CPT | Mod: S$GLB,,, | Performed by: NURSE PRACTITIONER

## 2024-06-18 PROCEDURE — 1159F MED LIST DOCD IN RCRD: CPT | Mod: CPTII,S$GLB,, | Performed by: NURSE PRACTITIONER

## 2024-06-18 PROCEDURE — 3008F BODY MASS INDEX DOCD: CPT | Mod: CPTII,S$GLB,, | Performed by: NURSE PRACTITIONER

## 2024-06-18 PROCEDURE — 1160F RVW MEDS BY RX/DR IN RCRD: CPT | Mod: CPTII,S$GLB,, | Performed by: NURSE PRACTITIONER

## 2024-06-18 RX ORDER — OMEPRAZOLE 40 MG/1
40 CAPSULE, DELAYED RELEASE ORAL DAILY
Qty: 30 CAPSULE | Refills: 2 | Status: SHIPPED | OUTPATIENT
Start: 2024-06-18 | End: 2024-09-16

## 2024-06-18 RX ORDER — ALBUTEROL SULFATE 90 UG/1
2 AEROSOL, METERED RESPIRATORY (INHALATION) 4 TIMES DAILY
COMMUNITY
Start: 2024-06-12

## 2024-06-18 NOTE — PROGRESS NOTES
Subjective:       Patient ID: Bertha Ochoa is a 34 y.o. female, Body mass index is 24.07 kg/m².    Chief Complaint: Dysphagia      Patient is new to me. Referred by Dr. Thomas for dysphagia.    Gastroesophageal Reflux  She complains of belching, chest pain (describes as pressure or heaviness), dysphagia (dysphagia to solids; denies trouble swallowing liquids), globus sensation, a hoarse voice and a sore throat. She reports no abdominal pain (Resolved since cholecystectomy), no choking, no coughing, no early satiety, no heartburn, no nausea, no stridor, no tooth decay, no water brash or no wheezing. This is a new problem. The current episode started more than 1 month ago (Started ~ 1.5 months ago). The problem occurs constantly. The problem has been unchanged. Exacerbated by: eating. Associated symptoms include weight loss (intentional weight loss; was taking Ozempic until 1/2024). Pertinent negatives include no anemia or melena. There are no known risk factors. She has tried a PPI (Currently: Protonix 40 mg once daily- no improvement) for the symptoms. Past procedures include an abdominal ultrasound. Past procedures do not include an EGD. Followed by ENT.     Review of Systems   Constitutional:  Positive for weight loss (intentional weight loss; was taking Ozempic until 1/2024). Negative for appetite change, fever and unexpected weight change.   HENT:  Positive for hoarse voice, sore throat and trouble swallowing.    Respiratory:  Negative for cough, choking, shortness of breath and wheezing.    Cardiovascular:  Positive for chest pain (describes as pressure or heaviness).   Gastrointestinal:  Positive for dysphagia (dysphagia to solids; denies trouble swallowing liquids). Negative for abdominal distention, abdominal pain (Resolved since cholecystectomy), anal bleeding, blood in stool, constipation, diarrhea, heartburn, melena, nausea, rectal pain and vomiting.   Genitourinary:  Negative for difficulty  urinating and dysuria.   Musculoskeletal:  Negative for gait problem.   Skin:  Negative for rash.   Neurological:  Negative for speech difficulty.   Psychiatric/Behavioral:  Negative for confusion.        Past Medical History:   Diagnosis Date    Eczema     History of MRSA infection 01/2022    facial      Past Surgical History:   Procedure Laterality Date    breast Bilateral 2010    reduction    BREAST SURGERY Bilateral 2006    augmentation    CHOLECYSTECTOMY      DILATION AND CURETTAGE OF UTERUS N/A 09/02/2022    Procedure: DILATION AND CURETTAGE, UTERUS;  Surgeon: Rosalinda Dasilva MD;  Location: [facility] OR;  Service: OB/GYN;  Laterality: N/A;    EXCISION OF BURSA OF HIP Bilateral 09/02/2022    Procedure: BURSECTOMY, HIP;  Surgeon: Stanford Thomas MD;  Location: [facility] OR;  Service: Orthopedics;  Laterality: Bilateral;    LAPAROSCOPIC CHOLECYSTECTOMY N/A 03/15/2024    Procedure: CHOLECYSTECTOMY-LAPAROSCOPIC;  Surgeon: Clint Nuñez MD;  Location: [facility] OR;  Service: General;  Laterality: N/A;    LAPAROSCOPIC LIGATION OF FALLOPIAN TUBE Bilateral 03/15/2024    Procedure: LIGATION, FALLOPIAN TUBE, LAPAROSCOPIC;  Surgeon: Rosalinda Dasilva MD;  Location: [facility] OR;  Service: OB/GYN;  Laterality: Bilateral;    REPAIR, HERNIA, UMBILICAL N/A 03/15/2024    Procedure: REPAIR, HERNIA, UMBILICAL;  Surgeon: Clint Nuñez MD;  Location: [facility] OR;  Service: General;  Laterality: N/A;    THERMAL ABLATION OF ENDOMETRIUM USING HYSTEROSCOPY N/A 09/02/2022    Procedure: ABLATION, ENDOMETRIUM, THERMAL, HYSTEROSCOPIC;  Surgeon: Rosalinda Dasilva MD;  Location: [facility] OR;  Service: OB/GYN;  Laterality: N/A;  cavity width 3.0  cavity legnth 6.0  power 99w  time 0:30sec      Family History   Problem Relation Name Age of Onset    Hyperlipidemia Father      Hypertension Father      Cancer Maternal Grandmother      Cancer Paternal Grandfather      Glaucoma Neg Hx      Cataracts Neg Hx      Macular degeneration Neg Hx      Retinal detachment Neg Hx       Amblyopia Neg Hx      Blindness Neg Hx      Strabismus Neg Hx        Wt Readings from Last 10 Encounters:   06/18/24 65.6 kg (144 lb 10 oz)   05/21/24 65.6 kg (144 lb 10 oz)   03/28/24 59.9 kg (132 lb)   03/15/24 59.9 kg (132 lb)   03/04/24 63 kg (139 lb)   02/21/24 63 kg (139 lb)   06/16/23 63 kg (139 lb)   09/02/22 66.7 kg (147 lb)   06/04/22 74.4 kg (164 lb)   09/15/21 95.7 kg (210 lb 15.7 oz)     Lab Results   Component Value Date    WBC 6.04 03/15/2024    HGB 13.4 03/15/2024    HCT 39.6 03/15/2024    MCV 91 03/15/2024     03/15/2024     CMP  Sodium   Date Value Ref Range Status   02/28/2024 137 136 - 145 mmol/L Final     Potassium   Date Value Ref Range Status   02/28/2024 4.1 3.5 - 5.1 mmol/L Final     Chloride   Date Value Ref Range Status   02/28/2024 105 95 - 110 mmol/L Final     CO2   Date Value Ref Range Status   02/28/2024 25 23 - 29 mmol/L Final     Glucose   Date Value Ref Range Status   02/28/2024 96 70 - 110 mg/dL Final     BUN   Date Value Ref Range Status   02/28/2024 13 6 - 20 mg/dL Final     Creatinine   Date Value Ref Range Status   02/28/2024 0.7 0.5 - 1.4 mg/dL Final     Calcium   Date Value Ref Range Status   02/28/2024 9.8 8.7 - 10.5 mg/dL Final     Total Protein   Date Value Ref Range Status   02/28/2024 7.2 6.0 - 8.4 g/dL Final     Albumin   Date Value Ref Range Status   02/28/2024 3.8 3.5 - 5.2 g/dL Final   08/31/2023 4.1 3.6 - 5.1 g/dL Final     Total Bilirubin   Date Value Ref Range Status   02/28/2024 1.1 (H) 0.1 - 1.0 mg/dL Final     Comment:     For infants and newborns, interpretation of results should be based  on gestational age, weight and in agreement with clinical  observations.    Premature Infant recommended reference ranges:  Up to 24 hours.............<8.0 mg/dL  Up to 48 hours............<12.0 mg/dL  3-5 days..................<15.0 mg/dL  6-29 days.................<15.0 mg/dL       Alkaline Phosphatase   Date Value Ref Range Status   02/28/2024 51 (L) 55 - 135  U/L Final     AST   Date Value Ref Range Status   02/28/2024 13 10 - 40 U/L Final     ALT   Date Value Ref Range Status   02/28/2024 11 10 - 44 U/L Final     Anion Gap   Date Value Ref Range Status   02/28/2024 7 (L) 8 - 16 mmol/L Final     eGFR if    Date Value Ref Range Status   06/06/2022 >60 >60 mL/min/1.73 m^2 Final     eGFR if non    Date Value Ref Range Status   06/06/2022 >60 >60 mL/min/1.73 m^2 Final     Comment:     Calculation used to obtain the estimated glomerular filtration  rate (eGFR) is the CKD-EPI equation.                 Reviewed prior medical records including radiology report of CT of abdomen and pelvis 2/27/24 and US of abdomen 2/27/24 & endoscopy history (see surgical history).     Objective:      Physical Exam  Constitutional:       General: She is not in acute distress.     Appearance: She is well-developed.   HENT:      Head: Normocephalic.      Right Ear: Hearing normal.      Left Ear: Hearing normal.      Nose: Nose normal.      Mouth/Throat:      Mouth: No oral lesions.      Pharynx: Uvula midline.   Eyes:      General: Lids are normal.      Conjunctiva/sclera: Conjunctivae normal.      Pupils: Pupils are equal, round, and reactive to light.   Neck:      Trachea: Trachea normal.   Cardiovascular:      Rate and Rhythm: Normal rate and regular rhythm.      Heart sounds: Normal heart sounds. No murmur heard.  Pulmonary:      Effort: Pulmonary effort is normal. No respiratory distress.      Breath sounds: Normal breath sounds. No stridor. No wheezing.   Abdominal:      General: Bowel sounds are normal. There is no distension.      Palpations: Abdomen is soft. There is no mass.      Tenderness: There is abdominal tenderness (mild) in the periumbilical area, left upper quadrant and left lower quadrant. There is no guarding or rebound.   Musculoskeletal:         General: Normal range of motion.      Cervical back: Normal range of motion.   Skin:     General:  Skin is warm and dry.      Findings: No rash.      Comments: Non jaundiced   Neurological:      Mental Status: She is alert and oriented to person, place, and time.   Psychiatric:         Speech: Speech normal.         Behavior: Behavior normal. Behavior is cooperative.           Assessment:       1. Chest pressure    2. Globus sensation    3. Esophageal dysphagia    4. S/P cholecystectomy           Plan:   All diagnoses and orders for this visit:    Chest pressure & Globus sensation  - Start: omeprazole (PRILOSEC) 40 MG capsule; Take 1 capsule (40 mg total) by mouth once daily.  Dispense: 30 capsule; Refill: 2  - Discontinue Protonix  - Schedule EGD   - Take PPI in the morning 30 minutes before breakfast  - Recommend to avoid large meals, avoid eating within 3 hours of bedtime, elevate head of bed if nocturnal symptoms are present, smoking cessation (if current smoker), & weight loss (if overweight).   - Recommend minimize/avoid high-fat foods, chocolate, caffeine, citrus, alcohol, & tomato products.  - Advised to avoid/limit use of NSAID's, since they can cause GI upset, bleeding, and/or ulcers. If needed, take with food.      Esophageal dysphagia  - Schedule EGD with possible esophageal dilation if indicated  - Educated patient to eat smaller more frequent meals and to eat slowly and advised to eat a soft diet.  - Possible UGI/esophagram/esophageal manometry if symptoms persist     S/P cholecystectomy    If no improvement in symptoms or symptoms worsen, call/follow-up at clinic or go to ER

## 2024-06-20 ENCOUNTER — HOSPITAL ENCOUNTER (OUTPATIENT)
Facility: HOSPITAL | Age: 34
Discharge: HOME | End: 2024-06-20
Attending: INTERNAL MEDICINE | Admitting: INTERNAL MEDICINE
Payer: COMMERCIAL

## 2024-06-20 ENCOUNTER — ANESTHESIA (OUTPATIENT)
Dept: ENDOSCOPY | Facility: HOSPITAL | Age: 34
End: 2024-06-20
Payer: COMMERCIAL

## 2024-06-20 ENCOUNTER — ANESTHESIA EVENT (OUTPATIENT)
Dept: ENDOSCOPY | Facility: HOSPITAL | Age: 34
End: 2024-06-20
Payer: COMMERCIAL

## 2024-06-20 VITALS
HEART RATE: 72 BPM | OXYGEN SATURATION: 99 % | DIASTOLIC BLOOD PRESSURE: 60 MMHG | WEIGHT: 142 LBS | SYSTOLIC BLOOD PRESSURE: 109 MMHG | RESPIRATION RATE: 16 BRPM | HEIGHT: 65 IN | BODY MASS INDEX: 23.66 KG/M2 | TEMPERATURE: 97 F

## 2024-06-20 DIAGNOSIS — R13.10 DYSPHAGIA: ICD-10-CM

## 2024-06-20 LAB
B-HCG UR QL: NEGATIVE
CTP QC/QA: YES

## 2024-06-20 PROCEDURE — 27201012 HC FORCEPS, HOT/COLD, DISP: Mod: PO | Performed by: INTERNAL MEDICINE

## 2024-06-20 PROCEDURE — 43248 EGD GUIDE WIRE INSERTION: CPT | Mod: ,,, | Performed by: INTERNAL MEDICINE

## 2024-06-20 PROCEDURE — 63600175 PHARM REV CODE 636 W HCPCS: Mod: PO | Performed by: INTERNAL MEDICINE

## 2024-06-20 PROCEDURE — 43248 EGD GUIDE WIRE INSERTION: CPT | Mod: PO | Performed by: INTERNAL MEDICINE

## 2024-06-20 PROCEDURE — C1769 GUIDE WIRE: HCPCS | Mod: PO | Performed by: INTERNAL MEDICINE

## 2024-06-20 PROCEDURE — 43239 EGD BIOPSY SINGLE/MULTIPLE: CPT | Mod: 59,PO | Performed by: INTERNAL MEDICINE

## 2024-06-20 PROCEDURE — 43239 EGD BIOPSY SINGLE/MULTIPLE: CPT | Mod: 59,,, | Performed by: INTERNAL MEDICINE

## 2024-06-20 PROCEDURE — 37000009 HC ANESTHESIA EA ADD 15 MINS: Mod: PO | Performed by: INTERNAL MEDICINE

## 2024-06-20 PROCEDURE — 88305 TISSUE EXAM BY PATHOLOGIST: CPT | Mod: 26,,, | Performed by: PATHOLOGY

## 2024-06-20 PROCEDURE — 88305 TISSUE EXAM BY PATHOLOGIST: CPT | Mod: 59,PO | Performed by: PATHOLOGY

## 2024-06-20 PROCEDURE — 81025 URINE PREGNANCY TEST: CPT | Mod: PO | Performed by: INTERNAL MEDICINE

## 2024-06-20 PROCEDURE — 25000003 PHARM REV CODE 250: Mod: PO | Performed by: NURSE ANESTHETIST, CERTIFIED REGISTERED

## 2024-06-20 PROCEDURE — 37000008 HC ANESTHESIA 1ST 15 MINUTES: Mod: PO | Performed by: INTERNAL MEDICINE

## 2024-06-20 RX ORDER — SODIUM CHLORIDE 0.9 % (FLUSH) 0.9 %
10 SYRINGE (ML) INJECTION
Status: DISCONTINUED | OUTPATIENT
Start: 2024-06-20 | End: 2024-06-20 | Stop reason: HOSPADM

## 2024-06-20 RX ORDER — SODIUM CHLORIDE, SODIUM LACTATE, POTASSIUM CHLORIDE, CALCIUM CHLORIDE 600; 310; 30; 20 MG/100ML; MG/100ML; MG/100ML; MG/100ML
INJECTION, SOLUTION INTRAVENOUS CONTINUOUS
Status: DISCONTINUED | OUTPATIENT
Start: 2024-06-20 | End: 2024-06-20 | Stop reason: HOSPADM

## 2024-06-20 RX ORDER — PROPOFOL 10 MG/ML
VIAL (ML) INTRAVENOUS
Status: DISCONTINUED | OUTPATIENT
Start: 2024-06-20 | End: 2024-06-20

## 2024-06-20 RX ORDER — LIDOCAINE HYDROCHLORIDE 20 MG/ML
INJECTION INTRAVENOUS
Status: DISCONTINUED | OUTPATIENT
Start: 2024-06-20 | End: 2024-06-20

## 2024-06-20 RX ADMIN — Medication 60 MG: at 01:06

## 2024-06-20 RX ADMIN — Medication 30 MG: at 01:06

## 2024-06-20 RX ADMIN — Medication 90 MG: at 01:06

## 2024-06-20 RX ADMIN — SODIUM CHLORIDE, POTASSIUM CHLORIDE, SODIUM LACTATE AND CALCIUM CHLORIDE: 600; 310; 30; 20 INJECTION, SOLUTION INTRAVENOUS at 12:06

## 2024-06-20 RX ADMIN — LIDOCAINE HYDROCHLORIDE 100 MG: 20 INJECTION INTRAVENOUS at 01:06

## 2024-06-20 NOTE — H&P
History & Physical - Short Stay  Gastroenterology      SUBJECTIVE:     Procedure: EGD    Chief Complaint/Indication for Procedure: Dysphagia    PTA Medications   Medication Sig    albuterol (PROVENTIL/VENTOLIN HFA) 90 mcg/actuation inhaler Inhale 2 puffs into the lungs 4 (four) times daily.    ALPRAZolam (XANAX) 0.5 MG tablet Take 1 tablet by mouth 3 times daily as needed.    buPROPion (WELLBUTRIN XL) 150 MG TB24 tablet Take 1 tablet (150 mg total) by mouth once daily.    ELDERBERRY FRUIT ORAL Take 1 capsule by mouth Daily.    eszopiclone (LUNESTA) 2 MG Tab Take 1 tablet (2 mg total) by mouth nightly.    methylPREDNISolone (MEDROL DOSEPACK) 4 mg tablet use as directed    omeprazole (PRILOSEC) 40 MG capsule Take 1 capsule (40 mg total) by mouth once daily.    UNABLE TO FIND medication name: Testosterone compound cream- 4 clicks daily    ammonium lactate (LAC-HYDRIN) 12 % lotion APPLY TO RASH twice daily as directed (Patient not taking: Reported on 6/18/2024)    triamcinolone acetonide 0.1% (KENALOG) 0.1 % cream Apply topically 2 (two) times daily. (Patient not taking: Reported on 5/21/2024)       Review of patient's allergies indicates:  No Known Allergies     Past Medical History:   Diagnosis Date    Eczema     History of MRSA infection 01/2022    facial     Past Surgical History:   Procedure Laterality Date    breast Bilateral 2010    reduction    BREAST SURGERY Bilateral 2006    augmentation    CHOLECYSTECTOMY      DILATION AND CURETTAGE OF UTERUS N/A 09/02/2022    Procedure: DILATION AND CURETTAGE, UTERUS;  Surgeon: Rosalinda Dasilva MD;  Location: [facility] OR;  Service: OB/GYN;  Laterality: N/A;    EXCISION OF BURSA OF HIP Bilateral 09/02/2022    Procedure: BURSECTOMY, HIP;  Surgeon: Stanford Thomas MD;  Location: [facility] OR;  Service: Orthopedics;  Laterality: Bilateral;    LAPAROSCOPIC CHOLECYSTECTOMY N/A 03/15/2024    Procedure: CHOLECYSTECTOMY-LAPAROSCOPIC;  Surgeon: Clint Nuñez MD;  Location: [facility] OR;   Service: General;  Laterality: N/A;    LAPAROSCOPIC LIGATION OF FALLOPIAN TUBE Bilateral 03/15/2024    Procedure: LIGATION, FALLOPIAN TUBE, LAPAROSCOPIC;  Surgeon: Rosalinda Dasilva MD;  Location: [facility] OR;  Service: OB/GYN;  Laterality: Bilateral;    REPAIR, HERNIA, UMBILICAL N/A 03/15/2024    Procedure: REPAIR, HERNIA, UMBILICAL;  Surgeon: Clint Nuñez MD;  Location: [facility] OR;  Service: General;  Laterality: N/A;    THERMAL ABLATION OF ENDOMETRIUM USING HYSTEROSCOPY N/A 09/02/2022    Procedure: ABLATION, ENDOMETRIUM, THERMAL, HYSTEROSCOPIC;  Surgeon: Rosalinda Dasilva MD;  Location: [facility] OR;  Service: OB/GYN;  Laterality: N/A;  cavity width 3.0  cavity legnth 6.0  power 99w  time 0:30sec     Family History   Problem Relation Name Age of Onset    Hyperlipidemia Father      Hypertension Father      Cancer Maternal Grandmother      Cancer Paternal Grandfather      Glaucoma Neg Hx      Cataracts Neg Hx      Macular degeneration Neg Hx      Retinal detachment Neg Hx      Amblyopia Neg Hx      Blindness Neg Hx      Strabismus Neg Hx       Social History     Tobacco Use    Smoking status: Never     Passive exposure: Never    Smokeless tobacco: Never   Substance Use Topics    Alcohol use: No    Drug use: No         OBJECTIVE:     Vital Signs (Most Recent)       Physical Exam:                                                       GENERAL:  Comfortable, in no acute distress.                                 HEENT EXAM:  Nonicteric.  No adenopathy.  Oropharynx is clear.               NECK:  Supple.                                                               LUNGS:  Clear.                                                               CARDIAC:  Regular rate and rhythm.  S1, S2.  No murmur.                      ABDOMEN:  Soft, positive bowel sounds, nontender.  No hepatosplenomegaly or masses.  No rebound or guarding.                                             EXTREMITIES:  No edema.     MENTAL STATUS:  Normal, alert and  oriented.      ASSESSMENT/PLAN:     Assessment: Dysphagia    Plan: EGD    Anesthesia Plan: General    ASA Grade: ASA 2 - Patient with mild systemic disease with no functional limitations    MALLAMPATI SCORE:  I (soft palate, uvula, fauces, and tonsillar pillars visible)

## 2024-06-20 NOTE — ANESTHESIA POSTPROCEDURE EVALUATION
Anesthesia Post Evaluation    Patient: Bertha Ochoa    Procedure(s) Performed: Procedure(s) (LRB):  EGD (ESOPHAGOGASTRODUODENOSCOPY) (N/A)    Final Anesthesia Type: general      Patient location during evaluation: PACU  Patient participation: Yes- Able to Participate  Level of consciousness: awake and alert  Post-procedure vital signs: reviewed and stable  Pain management: adequate  Airway patency: patent    PONV status at discharge: No PONV  Anesthetic complications: no      Cardiovascular status: blood pressure returned to baseline  Respiratory status: unassisted  Hydration status: euvolemic  Follow-up not needed.              Vitals Value Taken Time   /60 06/20/24 1352   Temp  06/20/24 1406   Pulse 72 06/20/24 1352   Resp 16 06/20/24 1352   SpO2 99 % 06/20/24 1352         Event Time   Out of Recovery 14:02:22         Pain/Jessy Score: Jessy Score: 10 (6/20/2024  1:52 PM)

## 2024-06-20 NOTE — TRANSFER OF CARE
"Anesthesia Transfer of Care Note    Patient: Bertha Ochoa    Procedure(s) Performed: Procedure(s) (LRB):  EGD (ESOPHAGOGASTRODUODENOSCOPY) (N/A)    Patient location: PACU    Anesthesia Type: general    Transport from OR: Transported from OR on room air with adequate spontaneous ventilation    Post pain: adequate analgesia    Post assessment: no apparent anesthetic complications and tolerated procedure well    Post vital signs: stable    Level of consciousness: awake and alert    Nausea/Vomiting: no nausea/vomiting    Complications: none    Transfer of care protocol was followed      Last vitals: Visit Vitals  BP (!) 94/54 (BP Location: Right arm, Patient Position: Lying)   Pulse 86   Temp 36.3 °C (97.3 °F) (Skin)   Resp 14   Ht 5' 5" (1.651 m)   Wt 64.4 kg (142 lb)   LMP 06/04/2024 (Approximate)   SpO2 97%   Breastfeeding No   BMI 23.63 kg/m²     "

## 2024-06-20 NOTE — PROVATION PATIENT INSTRUCTIONS
Discharge Summary/Instructions after an Endoscopic Procedure  Patient Name: Bertha Ochoa  Patient MRN: [id number]  Patient YOB: 1990 Thursday, June 20, 2024  Jaden Garcia MD  Dear patient,  As a result of recent federal legislation (The Federal Cures Act), you may   receive lab or pathology results from your procedure in your [facility]   account before your physician is able to contact you. Your physician or   their representative will relay the results to you with their   recommendations at their soonest availability.  Thank you,  RESTRICTIONS:  During your procedure today, you received medications for sedation.  These   medications may affect your judgment, balance and coordination.  Therefore,   for 24 hours, you have the following restrictions:   - DO NOT drive a car, operate machinery, make legal/financial decisions,   sign important papers or drink alcohol.    ACTIVITY:  Today: no heavy lifting, straining or running due to procedural   sedation/anesthesia.  The following day: return to full activity including work.  DIET:  Eat and drink normally unless instructed otherwise.     TREATMENT FOR COMMON SIDE EFFECTS:  - Mild abdominal pain, nausea, belching, bloating or excessive gas:  rest,   eat lightly and use a heating pad.  - Sore Throat: treat with throat lozenges and/or gargle with warm salt   water.  - Because air was used during the procedure, expelling large amounts of air   from your rectum or belching is normal.  - If a bowel prep was taken, you may not have a bowel movement for 1-3 days.    This is normal.  SYMPTOMS TO WATCH FOR AND REPORT TO YOUR PHYSICIAN:  1. Abdominal pain or bloating, other than gas cramps.  2. Chest pain.  3. Back pain.  4. Signs of infection such as: chills or fever occurring within 24 hours   after the procedure.  5. Rectal bleeding, which would show as bright red, maroon, or black stools.   (A tablespoon of blood from the rectum is not serious, especially  if   hemorrhoids are present.)  6. Vomiting.  7. Weakness or dizziness.  GO DIRECTLY TO THE NEAREST EMERGENCY ROOM IF YOU HAVE ANY OF THE FOLLOWING:      Difficulty breathing              Chills and/or fever over 101 F   Persistent vomiting and/or vomiting blood   Severe abdominal pain   Severe chest pain   Black, tarry stools   Bleeding- more than one tablespoon   Any other symptom or condition that you feel may need urgent attention  Your doctor recommends these additional instructions:  If any biopsies were taken, your doctors clinic will contact you in 1 to 2   weeks with any results.  We are waiting for your pathology results.   Continue your present medications.   An appointment has been made (or make an appointment) to see an ENT   specialist at appointment to be scheduled.   You are being discharged to home.  For questions, problems or results please call your physician - Jaden Garcia MD at Work:  [phone].  EMERGENCY PHONE NUMBER: [phone], LAB RESULTS: [phone]  IF A COMPLICATION OR EMERGENCY SITUATION ARISES AND YOU ARE UNABLE TO REACH   YOUR PHYSICIAN - GO DIRECTLY TO THE EMERGENCY ROOM.  ___________________________________________  Nurse Signature  ___________________________________________  Patient/Designated Responsible Party Signature  Jaden Garcia MD  6/20/2024 1:29:22 PM  This report has been verified and signed electronically.  Dear patient,  As a result of recent federal legislation (The Federal Cures Act), you may   receive lab or pathology results from your procedure in your [facility]   account before your physician is able to contact you. Your physician or   their representative will relay the results to you with their   recommendations at their soonest availability.  Thank you.  PROVATION

## 2024-06-20 NOTE — DISCHARGE SUMMARY
[address] - Endoscopy  Discharge Note  Short Stay  Discharge Note  Short Stay      SUMMARY     Admit Date: 6/20/2024    Attending Physician: Jaden Garcia MD     Discharge Physician: Jaden Garcia MD    Discharge Date: 6/20/2024 1:30 PM    Final Diagnosis: Globus sensation [R09.A2]  Dysphagia, unspecified type [R13.10]  Chest pressure [R07.89]    Disposition: HOME OR SELF CARE    Patient Instructions:   Current Discharge Medication List        CONTINUE these medications which have NOT CHANGED    Details   albuterol (PROVENTIL/VENTOLIN HFA) 90 mcg/actuation inhaler Inhale 2 puffs into the lungs 4 (four) times daily.      ALPRAZolam (XANAX) 0.5 MG tablet Take 1 tablet by mouth 3 times daily as needed.  Qty: 30 tablet, Refills: 2      buPROPion (WELLBUTRIN XL) 150 MG TB24 tablet Take 1 tablet (150 mg total) by mouth once daily.  Qty: 90 tablet, Refills: 0      ELDERBERRY FRUIT ORAL Take 1 capsule by mouth Daily.      eszopiclone (LUNESTA) 2 MG Tab Take 1 tablet (2 mg total) by mouth nightly.  Qty: 30 tablet, Refills: 3      methylPREDNISolone (MEDROL DOSEPACK) 4 mg tablet use as directed  Qty: 21 each, Refills: 0      omeprazole (PRILOSEC) 40 MG capsule Take 1 capsule (40 mg total) by mouth once daily.  Qty: 30 capsule, Refills: 2    Associated Diagnoses: Chest pressure; Esophageal dysphagia; Globus sensation      UNABLE TO FIND medication name: Testosterone compound cream- 4 clicks daily      ammonium lactate (LAC-HYDRIN) 12 % lotion APPLY TO RASH twice daily as directed  Qty: 225 g, Refills: 11      triamcinolone acetonide 0.1% (KENALOG) 0.1 % cream Apply topically 2 (two) times daily.  Qty: 454 g, Refills: 1    Associated Diagnoses: Allergic contact dermatitis due to other agents; Intrinsic atopic dermatitis             Discharge Procedure Orders (must include Diet, Follow-up, Activity)    Follow Up:  Follow up with PCP as previously scheduled  Resume routine diet.  Activity as tolerated.    No driving  day of procedure.

## 2024-06-20 NOTE — ANESTHESIA PREPROCEDURE EVALUATION
06/20/2024  Bertha Ochoa is a 34 y.o., female.      Pre-op Assessment    I have reviewed the Patient Summary Reports.     I have reviewed the Nursing Notes. I have reviewed the NPO Status.   I have reviewed the Medications.     Review of Systems  Anesthesia Hx:  No problems with previous Anesthesia                Cardiovascular:  Cardiovascular Normal                         Shortness of Breath                          Pulmonary:    Asthma  Shortness of breath     Asthma:               Neurological:  Neurology Normal                                          Physical Exam  General: Well nourished    Airway:  Mallampati: II   Mouth Opening: Normal  TM Distance: Normal  Neck ROM: Normal ROM        Anesthesia Plan  Type of Anesthesia, risks & benefits discussed:    Anesthesia Type: Gen Natural Airway  Intra-op Monitoring Plan: Standard ASA Monitors  Induction:  IV  Informed Consent: Informed consent signed with the Patient and all parties understand the risks and agree with anesthesia plan.  All questions answered.   ASA Score: 2    Ready For Surgery From Anesthesia Perspective.     .

## 2024-06-24 LAB
FINAL PATHOLOGIC DIAGNOSIS: NORMAL
GROSS: NORMAL
Lab: NORMAL

## 2024-07-02 ENCOUNTER — LAB VISIT (OUTPATIENT)
Dept: LAB | Facility: HOSPITAL | Age: 34
End: 2024-07-02
Attending: FAMILY MEDICINE
Payer: COMMERCIAL

## 2024-07-02 DIAGNOSIS — R13.10 DYSPHAGIA, UNSPECIFIED TYPE: Primary | ICD-10-CM

## 2024-07-02 DIAGNOSIS — R13.10 DYSPHAGIA, UNSPECIFIED TYPE: ICD-10-CM

## 2024-07-02 LAB
ESTIMATED AVG GLUCOSE: 100 MG/DL (ref 68–131)
HBA1C MFR BLD: 5.1 % (ref 4–5.6)
T3 SERPL-MCNC: 93 NG/DL (ref 60–180)
T4 FREE SERPL-MCNC: 0.85 NG/DL (ref 0.71–1.51)
T4 SERPL-MCNC: 5.6 UG/DL (ref 4.5–11.5)
TSH SERPL DL<=0.005 MIU/L-ACNC: 0.89 UIU/ML (ref 0.4–4)

## 2024-07-02 PROCEDURE — 83036 HEMOGLOBIN GLYCOSYLATED A1C: CPT | Performed by: ORTHOPAEDIC SURGERY

## 2024-07-02 PROCEDURE — 36415 COLL VENOUS BLD VENIPUNCTURE: CPT | Mod: PO | Performed by: ORTHOPAEDIC SURGERY

## 2024-07-02 PROCEDURE — 84439 ASSAY OF FREE THYROXINE: CPT | Performed by: ORTHOPAEDIC SURGERY

## 2024-07-02 PROCEDURE — 84443 ASSAY THYROID STIM HORMONE: CPT | Performed by: ORTHOPAEDIC SURGERY

## 2024-07-02 PROCEDURE — 84436 ASSAY OF TOTAL THYROXINE: CPT | Performed by: ORTHOPAEDIC SURGERY

## 2024-07-02 PROCEDURE — 82040 ASSAY OF SERUM ALBUMIN: CPT | Performed by: ORTHOPAEDIC SURGERY

## 2024-07-02 PROCEDURE — 86376 MICROSOMAL ANTIBODY EACH: CPT | Performed by: ORTHOPAEDIC SURGERY

## 2024-07-02 PROCEDURE — 84480 ASSAY TRIIODOTHYRONINE (T3): CPT | Performed by: ORTHOPAEDIC SURGERY

## 2024-07-02 PROCEDURE — 84270 ASSAY OF SEX HORMONE GLOBUL: CPT | Performed by: ORTHOPAEDIC SURGERY

## 2024-07-03 LAB — THYROPEROXIDASE IGG SERPL-ACNC: <6 IU/ML

## 2024-07-11 LAB
ALBUMIN SERPL-MCNC: 4.5 G/DL (ref 3.6–5.1)
TESTOST SERPL-MCNC: 16 NG/DL (ref 2–45)

## 2024-07-30 ENCOUNTER — PATIENT MESSAGE (OUTPATIENT)
Dept: GASTROENTEROLOGY | Facility: CLINIC | Age: 34
End: 2024-07-30
Payer: COMMERCIAL

## 2024-07-31 NOTE — TELEPHONE ENCOUNTER
Let her know that the Omeprazole helps with esophageal spasms and heartburn, so I recommend she continue to take it. If symptoms do not improve we could consider increasing the Omeprazole to BID.

## 2024-08-01 RX ORDER — OXYTOCIN 10 [USP'U]/ML
10 INJECTION, SOLUTION INTRAMUSCULAR; INTRAVENOUS DAILY PRN
Qty: 14 ML | Refills: 3 | Status: SHIPPED | OUTPATIENT
Start: 2024-08-01

## 2024-08-27 ENCOUNTER — OFFICE VISIT (OUTPATIENT)
Dept: PHYSICAL MEDICINE AND REHAB | Facility: CLINIC | Age: 34
End: 2024-08-27
Payer: COMMERCIAL

## 2024-08-27 DIAGNOSIS — G89.29: ICD-10-CM

## 2024-08-27 DIAGNOSIS — R51.9: ICD-10-CM

## 2024-08-27 DIAGNOSIS — G24.3 CERVICAL DYSTONIA: Primary | ICD-10-CM

## 2024-08-27 DIAGNOSIS — K13.79: ICD-10-CM

## 2024-08-27 DIAGNOSIS — M26.609: ICD-10-CM

## 2024-08-27 PROCEDURE — 1159F MED LIST DOCD IN RCRD: CPT | Mod: CPTII,S$GLB,, | Performed by: PHYSICAL MEDICINE & REHABILITATION

## 2024-08-27 PROCEDURE — 99999 PR PBB SHADOW E&M-EST. PATIENT-LVL II: CPT | Mod: PBBFAC,,, | Performed by: PHYSICAL MEDICINE & REHABILITATION

## 2024-08-27 PROCEDURE — 1160F RVW MEDS BY RX/DR IN RCRD: CPT | Mod: CPTII,S$GLB,, | Performed by: PHYSICAL MEDICINE & REHABILITATION

## 2024-08-27 PROCEDURE — 99203 OFFICE O/P NEW LOW 30 MIN: CPT | Mod: S$GLB,,, | Performed by: PHYSICAL MEDICINE & REHABILITATION

## 2024-08-27 PROCEDURE — 3044F HG A1C LEVEL LT 7.0%: CPT | Mod: CPTII,S$GLB,, | Performed by: PHYSICAL MEDICINE & REHABILITATION

## 2024-08-27 NOTE — PROGRESS NOTES
[facility]    CHIEF COMPLAINT:  1. No chief complaint on file.    2. Dystonia management recommendations.    HISTORY OF PRESENT ILLNESS: Bertha Ochoa is a 34 y.o. female who presents to me for evaluation and management of chronic neck and facial pain.     Today reports, chronic neck and facial pain without noted traumatic event. 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 Also, bilateral facial pain over the lateral jaw below the ear. Notes popping sensation with eating and constant pain. No dental concerns.     Head positioning: tightness to posterior neck and difficulty with flexion    Medications: muscle relaxers without benefit  Injections: none  Therapies: HEP, has trialed nightguards for TMD but did not tolerate due to pain.     Review of Systems   Constitutional: Negative for fever.   HENT: Negative for drooling.    Eyes: Negative for discharge.   Respiratory: Negative for choking.    Cardiovascular: Negative for chest pain.   Genitourinary: Negative for flank pain.   Skin: Negative for wound.   Allergic/Immunologic: Negative for immunocompromised state.   Neurological: Negative for tremors and syncope.   Psychiatric/Behavioral: Negative for behavioral problems.     Past Medical History:   Past Medical History:   Diagnosis Date    Eczema     History of MRSA infection 01/2022    facial       Past Surgical History:   Past Surgical History:   Procedure Laterality Date    breast Bilateral 2010    reduction    BREAST SURGERY Bilateral 2006    augmentation    CHOLECYSTECTOMY      DILATION AND CURETTAGE OF UTERUS N/A 09/02/2022    Procedure: DILATION AND CURETTAGE, UTERUS;  Surgeon: Rosalinda Dasilva MD;  Location: [facility];  Service: OB/GYN;  Laterality: N/A;    ESOPHAGOGASTRODUODENOSCOPY N/A 6/20/2024    Procedure: EGD (ESOPHAGOGASTRODUODENOSCOPY);  Surgeon: Jaden Garcia MD;  [facility];  Service:  Gastroenterology;  Laterality: N/A;    EXCISION OF BURSA OF HIP Bilateral 09/02/2022    Procedure: BURSECTOMY, HIP;  Surgeon: Stanford Thomas MD;  Location: [facility] OR;  Service: Orthopedics;  Laterality: Bilateral;    LAPAROSCOPIC CHOLECYSTECTOMY N/A 03/15/2024    Procedure: CHOLECYSTECTOMY-LAPAROSCOPIC;  Surgeon: Clint Nuñez MD;  Location: [facility] OR;  Service: General;  Laterality: N/A;    LAPAROSCOPIC LIGATION OF FALLOPIAN TUBE Bilateral 03/15/2024    Procedure: LIGATION, FALLOPIAN TUBE, LAPAROSCOPIC;  Surgeon: Rosalinda Dasilva MD;  Location: [facility] OR;  Service: OB/GYN;  Laterality: Bilateral;    REPAIR, HERNIA, UMBILICAL N/A 03/15/2024    Procedure: REPAIR, HERNIA, UMBILICAL;  Surgeon: Clint Nuñez MD;  Location: [facility] OR;  Service: General;  Laterality: N/A;    THERMAL ABLATION OF ENDOMETRIUM USING HYSTEROSCOPY N/A 09/02/2022    Procedure: ABLATION, ENDOMETRIUM, THERMAL, HYSTEROSCOPIC;  Surgeon: Rosalinda Dasilva MD;  Location: [facility] OR;  Service: OB/GYN;  Laterality: N/A;  cavity width 3.0  cavity legnth 6.0  power 99w  time 0:30sec       Family History:   Family History   Problem Relation Name Age of Onset    Hyperlipidemia Father      Hypertension Father      Cancer Maternal Grandmother      Cancer Paternal Grandfather      Glaucoma Neg Hx      Cataracts Neg Hx      Macular degeneration Neg Hx      Retinal detachment Neg Hx      Amblyopia Neg Hx      Blindness Neg Hx      Strabismus Neg Hx         Medications:   Current Outpatient Medications on File Prior to Visit   Medication Sig Dispense Refill    albuterol (PROVENTIL/VENTOLIN HFA) 90 mcg/actuation inhaler Inhale 2 puffs into the lungs 4 (four) times daily.      ALPRAZolam (XANAX) 0.5 MG tablet Take 1 tablet by mouth 3 times daily as needed. 30 tablet 2    ammonium lactate (LAC-HYDRIN) 12 % lotion APPLY TO RASH twice daily as directed (Patient not taking: Reported on 6/18/2024) 225 g 11    buPROPion (WELLBUTRIN XL) 150 MG TB24 tablet Take 1 tablet by  mouth once daily. 90 tablet 0    buPROPion (WELLBUTRIN XL) 300 MG 24 hr tablet Take one tablet by mouth once a day 90 tablet 0    doxycycline (VIBRA-TABS) 100 MG tablet Take 1 tablet by mouth twice a day 14 tablet 0    ELDERBERRY FRUIT ORAL Take 1 capsule by mouth Daily.      eszopiclone (LUNESTA) 2 MG Tab Take 1 tablet (2 mg total) by mouth nightly. 30 tablet 3    omeprazole (PRILOSEC) 40 MG capsule Take 1 capsule (40 mg total) by mouth once daily. 30 capsule 2    oxytocin (PITOCIN) 10 unit/mL injection 1 mL (10 Units total) by Other route daily as needed (activity). 14 mL 3    progesterone (PROMETRIUM) 100 MG capsule Take 1 capsule by mouth nightly 30 capsule 2    triamcinolone acetonide 0.1% (KENALOG) 0.1 % cream Apply topically 2 (two) times daily. (Patient not taking: Reported on 5/21/2024) 454 g 1    UNABLE TO FIND medication name: Testosterone compound cream- 4 clicks daily       Current Facility-Administered Medications on File Prior to Visit   Medication Dose Route Frequency Provider Last Rate Last Admin    HYDROmorphone injection 0.5 mg  0.5 mg Intravenous Q5 Min PRN Yamile Dewitt MD   0.5 mg at 03/15/24 0924    lactated ringers infusion   Intravenous Continuous Harry Chen MD 20 mL/hr at 09/02/22 0601 New Bag at 09/02/22 0835    oxyCODONE-acetaminophen 5-325 mg per tablet 1 tablet  1 tablet Oral Q3H PRN Yamile Dewitt MD           Allergies: Review of patient's allergies indicates:  No Known Allergies    Social History:   Social History     Socioeconomic History    Marital status:    Tobacco Use    Smoking status: Never     Passive exposure: Never    Smokeless tobacco: Never   Substance and Sexual Activity    Alcohol use: No    Drug use: No    Sexual activity: Yes     Social Determinants of Health     Financial Resource Strain: Low Risk  (5/20/2024)    Overall Financial Resource Strain (CARDIA)     Difficulty of Paying Living Expenses: Not hard at all   Food Insecurity: No Food  Insecurity (5/20/2024)    Hunger Vital Sign     Worried About Running Out of Food in the Last Year: Never true     Ran Out of Food in the Last Year: Never true   Physical Activity: Insufficiently Active (5/20/2024)    Exercise Vital Sign     Days of Exercise per Week: 2 days     Minutes of Exercise per Session: 30 min   Stress: Stress Concern Present (5/20/2024)    [facility] of Occupational Health - Occupational Stress Questionnaire     Feeling of Stress : To some extent   Housing Stability: Unknown (5/20/2024)    Housing Stability Vital Sign     Unable to Pay for Housing in the Last Year: No       PHYSICAL EXAMINATION:   There were no vitals filed for this visit.  Constitutional: No apparent distress. Pleasant.  HENT: Trachea midline.  Head: Normocephalic and atraumatic. Head and neck revealed slight retrocaput  Eyes: Right eye exhibits no discharge. Left eye exhibits no discharge. No scleral icterus.   CV: Well perfused.   Pulmonary/Chest: Effort normal. No respiratory distress.   Abdominal: There is no guarding.   Neurological: Awake, alert and cooperative.  SKIN: Intact no apparent lesions, cut, ulcers or abrasions  EXT:  No cyanosis, clubbing, or edema.  SENSORY: Intact to light touch in the bilateral upper extemities.  MUSCULOSKELETAL:   Muscle Strength:(0-5)                              Right     Left  Shoulder abduction:               5  5  Shoulder external rotation:     5  5  Elbow flexion:              5  5  Elbow extension:          5  5  Wrist extension:             5  5  Wrist flexion:             5  5  Finger extension:        5  5  Finger flexion:   5  5  Finger abduction:  5  5    Reflexes: 0-4+/4   Right     Left  Biceps (C5):  2+  2+  Brachioradialis (C6): 2+  2+  Triceps (C7):  2+  2+    INSPECTION:         Right     Left   Localized/Generalized swelling:   -  -  Muscle contours normal and symmetrical: +  +  Ecchymoses:      -  -  Erythema:      -  -  Gross deformity:    -  -    RANGE OF  MOTION:           Right      Left  Flexion:  Full  Full   Extension:  Full  Full  Rotation:   Full  Full  Lateral side bending: Full  Full  Other:     TENDERNESS:                 Right      Left  Spinous process:       -  -  Cervical paraspinals:  +  +  Trapezius:   +  +  Sub-occipital region:  -  -  Other: bilateral masseters; pain over bilateral TMJs with right sided palpable clicking with PROM of jaw    SOFT TISSUE PALPATION:      Right  Left   Effusion:  -  -  Other:        SPECIAL TESTS:         Right     Left  Spurling's:  -  -  Wrist clonus:  -  -  Krishna's:  -  -    IMAGING:  CT maxillofacial 12/22/2021 with noted:   1. There appears to be asymmetrical subcutaneous strandy soft tissue edema about the left facial soft tissues. There is associated left facial skin thickening. There appears to be a focal area of intermediate soft tissue attenuation measuring approximately 8 mm at the epicenter of soft tissue edema with overlying convex skin/soft tissue borders. This is nonspecific, possibly related to infectious/inflammatory process with focal area of phlegmonous changes in the area of intermediate soft tissue attenuation. No obvious rim enhancing and drainable fluid collections are appreciated at this time.     Data Reviewed: CT  Supportive Actions: Independent visualization of images or test specimens.    ASSESSMENT:   1. Cervical dystonia    2. 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 Time was spent reviewing the above diagnosis in depth with Bertha today, including acute management and rehabilitation.     2. Dystonia:  - less than ideal response from oral muscle relaxers  - origin of neck and facial pain is due to underlying myofascial pain, recommend use of focal neurotoxin injections for management with 100 units Botox to the following muscles:   Left trapezius  30 units  Right trapezius 30 units  Left masseter  20 units  Right masseter 20 units    RTC for  planned Botox injections.

## 2024-09-09 DIAGNOSIS — M26.609: Primary | ICD-10-CM

## 2024-09-09 DIAGNOSIS — G89.29: Primary | ICD-10-CM

## 2024-09-09 DIAGNOSIS — K13.79: Primary | ICD-10-CM

## 2024-09-09 DIAGNOSIS — R51.9: Primary | ICD-10-CM

## 2024-10-09 PROBLEM — N93.9 ABNORMAL UTERINE BLEEDING: Status: ACTIVE | Noted: 2024-10-09

## 2024-10-29 DIAGNOSIS — T63.441A BEE STING REACTION, ACCIDENTAL OR UNINTENTIONAL, INITIAL ENCOUNTER: Primary | ICD-10-CM

## 2024-10-29 RX ORDER — HYDROCORTISONE 25 MG/G
OINTMENT TOPICAL 2 TIMES DAILY
Qty: 28.35 G | Refills: 2 | Status: SHIPPED | OUTPATIENT
Start: 2024-10-29

## 2024-11-06 RX ORDER — AZITHROMYCIN 250 MG/1
TABLET, FILM COATED ORAL
Qty: 6 TABLET | Refills: 0 | Status: SHIPPED | OUTPATIENT
Start: 2024-11-06 | End: 2024-11-12

## 2024-11-07 ENCOUNTER — HOSPITAL ENCOUNTER (OUTPATIENT)
Dept: RADIOLOGY | Facility: HOSPITAL | Age: 34
Discharge: HOME | End: 2024-11-07
Attending: ORTHOPAEDIC SURGERY
Payer: COMMERCIAL

## 2024-11-07 DIAGNOSIS — R05.1 ACUTE COUGH: Primary | ICD-10-CM

## 2024-11-07 DIAGNOSIS — R05.1 ACUTE COUGH: ICD-10-CM

## 2024-11-07 PROCEDURE — 71046 X-RAY EXAM CHEST 2 VIEWS: CPT | Mod: TC,PO

## 2024-11-07 PROCEDURE — 71046 X-RAY EXAM CHEST 2 VIEWS: CPT | Mod: 26,,, | Performed by: RADIOLOGY

## 2024-11-07 RX ORDER — AMOXICILLIN AND CLAVULANATE POTASSIUM 875; 125 MG/1; MG/1
1 TABLET, FILM COATED ORAL 2 TIMES DAILY
Qty: 20 TABLET | Refills: 0 | Status: SHIPPED | OUTPATIENT
Start: 2024-11-07

## 2024-12-12 PROBLEM — G89.18 POST-OPERATIVE PAIN: Status: ACTIVE | Noted: 2024-12-12

## 2024-12-13 DIAGNOSIS — R11.2 POST-OPERATIVE NAUSEA AND VOMITING: Primary | ICD-10-CM

## 2024-12-13 DIAGNOSIS — Z98.890 POST-OPERATIVE NAUSEA AND VOMITING: Primary | ICD-10-CM

## 2024-12-13 RX ORDER — PROMETHAZINE HYDROCHLORIDE 25 MG/1
25 TABLET ORAL EVERY 6 HOURS PRN
Qty: 30 TABLET | Refills: 0 | Status: SHIPPED | OUTPATIENT
Start: 2024-12-13 | End: 2024-12-23

## 2025-02-18 ENCOUNTER — LAB VISIT (OUTPATIENT)
Dept: LAB | Facility: HOSPITAL | Age: 35
End: 2025-02-18
Attending: ORTHOPAEDIC SURGERY
Payer: COMMERCIAL

## 2025-02-18 DIAGNOSIS — R39.15 URINARY URGENCY: ICD-10-CM

## 2025-02-18 DIAGNOSIS — R11.2 NAUSEA AND VOMITING, UNSPECIFIED VOMITING TYPE: Primary | ICD-10-CM

## 2025-02-18 DIAGNOSIS — R39.15 URINARY URGENCY: Primary | ICD-10-CM

## 2025-02-18 LAB
25(OH)D3+25(OH)D2 SERPL-MCNC: 36 NG/ML (ref 30–96)
ALBUMIN SERPL BCP-MCNC: 4 G/DL (ref 3.5–5.2)
ALP SERPL-CCNC: 49 U/L (ref 40–150)
ALT SERPL W/O P-5'-P-CCNC: 19 U/L (ref 10–44)
ANION GAP SERPL CALC-SCNC: 11 MMOL/L (ref 8–16)
AST SERPL-CCNC: 30 U/L (ref 10–40)
BASOPHILS # BLD AUTO: 0.03 K/UL (ref 0–0.2)
BASOPHILS NFR BLD: 0.8 % (ref 0–1.9)
BILIRUB SERPL-MCNC: 0.7 MG/DL (ref 0.1–1)
BUN SERPL-MCNC: 8 MG/DL (ref 6–20)
CALCIUM SERPL-MCNC: 8.7 MG/DL (ref 8.7–10.5)
CHLORIDE SERPL-SCNC: 105 MMOL/L (ref 95–110)
CO2 SERPL-SCNC: 22 MMOL/L (ref 23–29)
CREAT SERPL-MCNC: 0.7 MG/DL (ref 0.5–1.4)
DIFFERENTIAL METHOD BLD: ABNORMAL
EOSINOPHIL # BLD AUTO: 0.2 K/UL (ref 0–0.5)
EOSINOPHIL NFR BLD: 4.1 % (ref 0–8)
ERYTHROCYTE [DISTWIDTH] IN BLOOD BY AUTOMATED COUNT: 12.5 % (ref 11.5–14.5)
EST. GFR  (NO RACE VARIABLE): >60 ML/MIN/1.73 M^2
ESTRADIOL SERPL-MCNC: 33 PG/ML
FSH SERPL-ACNC: 6.23 MIU/ML
GLUCOSE SERPL-MCNC: 100 MG/DL (ref 70–110)
HCT VFR BLD AUTO: 36.6 % (ref 37–48.5)
HGB BLD-MCNC: 12.2 G/DL (ref 12–16)
IMM GRANULOCYTES # BLD AUTO: 0 K/UL (ref 0–0.04)
IMM GRANULOCYTES NFR BLD AUTO: 0 % (ref 0–0.5)
LYMPHOCYTES # BLD AUTO: 1.8 K/UL (ref 1–4.8)
LYMPHOCYTES NFR BLD: 44.6 % (ref 18–48)
MCH RBC QN AUTO: 31.5 PG (ref 27–31)
MCHC RBC AUTO-ENTMCNC: 33.3 G/DL (ref 32–36)
MCV RBC AUTO: 95 FL (ref 82–98)
MONOCYTES # BLD AUTO: 0.4 K/UL (ref 0.3–1)
MONOCYTES NFR BLD: 9.4 % (ref 4–15)
NEUTROPHILS # BLD AUTO: 1.6 K/UL (ref 1.8–7.7)
NEUTROPHILS NFR BLD: 41.1 % (ref 38–73)
NRBC BLD-RTO: 0 /100 WBC
PLATELET # BLD AUTO: 194 K/UL (ref 150–450)
PMV BLD AUTO: 10.3 FL (ref 9.2–12.9)
POTASSIUM SERPL-SCNC: 3.6 MMOL/L (ref 3.5–5.1)
PROGEST SERPL-MCNC: 4.1 NG/ML
PROT SERPL-MCNC: 6.8 G/DL (ref 6–8.4)
RBC # BLD AUTO: 3.87 M/UL (ref 4–5.4)
SODIUM SERPL-SCNC: 138 MMOL/L (ref 136–145)
T3FREE SERPL-MCNC: 2.6 PG/ML (ref 2.3–4.2)
T4 FREE SERPL-MCNC: 0.81 NG/DL (ref 0.71–1.51)
THYROPEROXIDASE IGG SERPL-ACNC: <6 IU/ML
TSH SERPL DL<=0.005 MIU/L-ACNC: 0.6 UIU/ML (ref 0.4–4)
VIT B12 SERPL-MCNC: 345 PG/ML (ref 210–950)
WBC # BLD AUTO: 3.92 K/UL (ref 3.9–12.7)

## 2025-02-18 PROCEDURE — 84439 ASSAY OF FREE THYROXINE: CPT | Performed by: ORTHOPAEDIC SURGERY

## 2025-02-18 PROCEDURE — 80053 COMPREHEN METABOLIC PANEL: CPT | Performed by: ORTHOPAEDIC SURGERY

## 2025-02-18 PROCEDURE — 36415 COLL VENOUS BLD VENIPUNCTURE: CPT | Mod: PO | Performed by: ORTHOPAEDIC SURGERY

## 2025-02-18 PROCEDURE — 82670 ASSAY OF TOTAL ESTRADIOL: CPT | Performed by: ORTHOPAEDIC SURGERY

## 2025-02-18 PROCEDURE — 84403 ASSAY OF TOTAL TESTOSTERONE: CPT | Performed by: ORTHOPAEDIC SURGERY

## 2025-02-18 PROCEDURE — 83001 ASSAY OF GONADOTROPIN (FSH): CPT | Performed by: ORTHOPAEDIC SURGERY

## 2025-02-18 PROCEDURE — 82607 VITAMIN B-12: CPT | Performed by: ORTHOPAEDIC SURGERY

## 2025-02-18 PROCEDURE — 82306 VITAMIN D 25 HYDROXY: CPT | Performed by: ORTHOPAEDIC SURGERY

## 2025-02-18 PROCEDURE — 84443 ASSAY THYROID STIM HORMONE: CPT | Performed by: ORTHOPAEDIC SURGERY

## 2025-02-18 PROCEDURE — 86376 MICROSOMAL ANTIBODY EACH: CPT | Performed by: ORTHOPAEDIC SURGERY

## 2025-02-18 PROCEDURE — 84481 FREE ASSAY (FT-3): CPT | Performed by: ORTHOPAEDIC SURGERY

## 2025-02-18 PROCEDURE — 85025 COMPLETE CBC W/AUTO DIFF WBC: CPT | Performed by: ORTHOPAEDIC SURGERY

## 2025-02-18 PROCEDURE — 84144 ASSAY OF PROGESTERONE: CPT | Performed by: ORTHOPAEDIC SURGERY

## 2025-02-18 RX ORDER — ONDANSETRON 8 MG/1
8 TABLET, ORALLY DISINTEGRATING ORAL EVERY 6 HOURS PRN
Qty: 30 TABLET | Refills: 2 | Status: SHIPPED | OUTPATIENT
Start: 2025-02-18

## 2025-02-18 RX ORDER — MIRABEGRON 25 MG/1
25 TABLET, FILM COATED, EXTENDED RELEASE ORAL DAILY
Qty: 30 TABLET | Refills: 2 | Status: SHIPPED | OUTPATIENT
Start: 2025-02-18

## 2025-02-24 LAB
ALBUMIN SERPL-MCNC: 4.3 G/DL (ref 3.6–5.1)
SHBG SERPL-SCNC: 62 NMOL/L (ref 17–124)
TESTOST FREE SERPL-MCNC: 0.6 PG/ML (ref 0.2–5)
TESTOST SERPL-MCNC: 8 NG/DL (ref 2–45)
TESTOSTERONE.FREE+WB SERPL-MCNC: 1.1 NG/DL (ref 0.5–8.5)

## 2025-03-18 DIAGNOSIS — R11.2 NAUSEA AND VOMITING, UNSPECIFIED VOMITING TYPE: Primary | ICD-10-CM

## 2025-03-18 DIAGNOSIS — H00.012 HORDEOLUM EXTERNUM OF RIGHT LOWER EYELID: Primary | ICD-10-CM

## 2025-03-18 DIAGNOSIS — H00.012 HORDEOLUM EXTERNUM OF RIGHT LOWER EYELID: ICD-10-CM

## 2025-03-18 DIAGNOSIS — T63.441A BEE STING REACTION, ACCIDENTAL OR UNINTENTIONAL, INITIAL ENCOUNTER: ICD-10-CM

## 2025-03-18 RX ORDER — NEOMYCIN AND POLYMYXIN B SULFATES AND BACITRACIN ZINC 400; 3.5; 1 [USP'U]/G; MG/G; [USP'U]/G
OINTMENT OPHTHALMIC 4 TIMES DAILY
Qty: 3.5 G | Refills: 0 | Status: SHIPPED | OUTPATIENT
Start: 2025-03-18 | End: 2025-03-28

## 2025-03-18 RX ORDER — HYDROCORTISONE 25 MG/G
OINTMENT TOPICAL 2 TIMES DAILY
Qty: 28.35 G | Refills: 0 | Status: SHIPPED | OUTPATIENT
Start: 2025-03-18

## 2025-03-18 RX ORDER — ONDANSETRON 4 MG/1
4 TABLET, ORALLY DISINTEGRATING ORAL EVERY 6 HOURS PRN
Qty: 30 TABLET | Refills: 1 | Status: SHIPPED | OUTPATIENT
Start: 2025-03-18

## 2025-03-18 RX ORDER — NEOMYCIN AND POLYMYXIN B SULFATES AND BACITRACIN ZINC 400; 3.5; 1 [USP'U]/G; MG/G; [USP'U]/G
OINTMENT OPHTHALMIC 4 TIMES DAILY
Qty: 3.5 G | Refills: 0 | Status: SHIPPED | OUTPATIENT
Start: 2025-03-18 | End: 2025-03-18 | Stop reason: SDUPTHER

## 2025-03-26 RX ORDER — METHOCARBAMOL 750 MG/1
750 TABLET, FILM COATED ORAL 4 TIMES DAILY PRN
Qty: 44 TABLET | Refills: 3 | Status: SHIPPED | OUTPATIENT
Start: 2025-03-26

## 2025-03-26 RX ORDER — IBUPROFEN 800 MG/1
800 TABLET ORAL EVERY 8 HOURS PRN
Qty: 60 TABLET | Refills: 2 | Status: SHIPPED | OUTPATIENT
Start: 2025-03-26 | End: 2026-03-26

## 2025-04-10 ENCOUNTER — OFFICE VISIT (OUTPATIENT)
Dept: OPTOMETRY | Facility: CLINIC | Age: 35
End: 2025-04-10
Payer: COMMERCIAL

## 2025-04-10 ENCOUNTER — TELEPHONE (OUTPATIENT)
Dept: OPHTHALMOLOGY | Facility: CLINIC | Age: 35
End: 2025-04-10
Payer: COMMERCIAL

## 2025-04-10 DIAGNOSIS — Z46.0 CONTACT LENS/GLASSES FITTING: Primary | ICD-10-CM

## 2025-04-10 DIAGNOSIS — H43.393 VITREOUS FLOATERS, BILATERAL: ICD-10-CM

## 2025-04-10 DIAGNOSIS — H52.13 MYOPIA, BILATERAL: ICD-10-CM

## 2025-04-10 DIAGNOSIS — Z46.0 CONTACT LENS/GLASSES FITTING: ICD-10-CM

## 2025-04-10 DIAGNOSIS — Z01.00 EXAMINATION OF EYES AND VISION: Primary | ICD-10-CM

## 2025-04-10 DIAGNOSIS — H04.123 DRY EYES, BILATERAL: ICD-10-CM

## 2025-04-10 DIAGNOSIS — H10.13 ALLERGIC CONJUNCTIVITIS OF BOTH EYES: ICD-10-CM

## 2025-04-10 PROCEDURE — 99999 PR PBB SHADOW E&M-EST. PATIENT-LVL III: CPT | Mod: PBBFAC,,, | Performed by: OPTOMETRIST

## 2025-04-10 PROCEDURE — 99499 UNLISTED E&M SERVICE: CPT | Mod: ,,, | Performed by: OPTOMETRIST

## 2025-04-10 PROCEDURE — 1159F MED LIST DOCD IN RCRD: CPT | Mod: CPTII,S$GLB,, | Performed by: OPTOMETRIST

## 2025-04-10 PROCEDURE — 92014 COMPRE OPH EXAM EST PT 1/>: CPT | Mod: S$GLB,,, | Performed by: OPTOMETRIST

## 2025-04-10 PROCEDURE — 92015 DETERMINE REFRACTIVE STATE: CPT | Mod: S$GLB,,, | Performed by: OPTOMETRIST

## 2025-04-10 RX ORDER — ALPRAZOLAM 0.5 MG/1
0.5 TABLET ORAL
COMMUNITY
Start: 2025-03-27

## 2025-04-10 RX ORDER — LOTEPREDNOL ETABONATE 5 MG/ML
1 SUSPENSION/ DROPS OPHTHALMIC 2 TIMES DAILY PRN
Qty: 5 ML | Refills: 1 | Status: SHIPPED | OUTPATIENT
Start: 2025-04-10 | End: 2026-04-10

## 2025-04-10 NOTE — TELEPHONE ENCOUNTER
----- Message from GUILLE Toribio OD sent at 4/10/2025  9:12 AM CDT -----  Please call to discuss lasik options and possible schedule for consult ---thanks

## 2025-04-10 NOTE — PATIENT INSTRUCTIONS
"DRY EYES -- BURNING OR KELIN SYMPTOMS:  Use Over The Counter artificial tears as needed for dry eye symptoms.   Some common brands include:  Systane, Optive, Refresh, and Thera-Tears.  These drops can be used as frequently as desired, but may be most helpful use during long periods of concentrated work.  For example, reading / working at the computer. Start with 3-4x per day.     Nighttime Ophthalmic gel or ointments are available: Refresh PM, Genteal, and Lacrilube.    Avoid drops that "get redness out" (Visine, Murine, Clear Eyes), as these may contain medication that could further irritate the eyes, especially with chronic use.    ALLERGY EYES -- ITCHING SYMPTOMS:  Over the counter medications include--Pataday, Zaditor, and Alaway.  Use as directed 1-2 drops daily for symptoms of itching / watering eyes.  These drops will not help for dry eye or exposure symptoms.    REDNESS RELIEF:  Lumify---is a good redness reliever that will not cause irritation if used chronically.        FLASHES / FLOATERS / POSTERIOR VITREOUS DETACHMENT    Call the clinic if you have any further changes in symptoms.  Including:  Increased numbers of floaters or flashing lights, dimness or darkness that moves through or stays constant in your vision, or any pain in the eye (s).    You may sometimes see small specks or clouds moving in your field of vision.  They are called FLOATERS.  You can often see them when looking at a plain background, like a blank wall or blue rose.  Floaters are actually tiny clumps of gel or cells inside the VITREOUS, the clear jelly-like fluid that fills the inside of your eye.    While these objects look like they are in front of your eye, they are actually floating inside.  What you see are the shadows they cast on the RETINA, the nerve layer at the back of the eye that senses light and allows you to see.      POSTERIOR VITREOUS DETACHMENT    The appearance of new floaters may be alarming.  If you suddenly " develop new floaters, you should contact your eye care professional  right away.    The retina can tear if the shrinking vitreous pulls away from the wall of the eye.  This sometimes causes a small amount of bleeding in the eye that may appear as new floaters.    A torn retina is always a serious problem, since it can lead to a retinal detachment.  You should see your eye care professional as soon as possible if:    even one new floater appears suddenly;  you see sudden flashes of light;  you notice other symptoms, like the loss of side vision, or a curtain closes down in your vision        POSTERIOR VITREOUS DETACHMENT is more common for people who:    are nearsighted;  have had cataract surgery;  have had YAG laser surgery of the eye;  have had inflammation inside the eye;  are over age 60.      While some floaters may remain visible, many of them will fade over time and become less noticeable.  Even if you've had some floaters for years, you should have your eyes checked as soon as possible if you notice new ones.    FLASHING LIGHTS    When the vitreous gel rubs or pulls on the retina, you may see what look like flashing lights or lightning streaks.  These flashes can appear off and on for several weeks or months.      Some people experience flashes of light that appear as jagged lines or heat waves in both eyes, lasting 10-20 minutes.  These flashes are caused by a spasm of blood vessels in the brain, which is called a migraine.    If a headache follows these flashes, it's called a migraine headache.  If   no headache occurs, these flashes are called Ophthalmic or Ocular Migraine.           DAILY WEAR CONTACT LENSES  Continue with Daily Wear of contact lenses, up to all waking hours.  Continue with approved contact lens disinfection / rewetting drops as discussed.  Dispose of lenses daily  Stop wearing your lenses and call our office if redness, blurred vision, or pain persists more than 12 hours.  We  recommend an annual eye exam for contact lens patients.

## 2025-04-10 NOTE — PROGRESS NOTES
HPI    Pt here for annual with CL. Last exam- 1 year    Pt VA slightly blurry. Pt wears Infuse but is drying eyes, wants to try   something new, daily. Pt c/o itchy eyes and skin. Pt using OTC with   minimal relief. Pt denies flashes/floaters/pain.  Last edited by Keesha Becerra on 4/10/2025  8:24 AM.            Assessment /Plan     For exam results, see Encounter Report.    Examination of eyes and vision    Myopia, bilateral    Contact lens/glasses fitting    Vitreous floaters, bilateral    Allergic conjunctivitis of both eyes  -     loteprednol (LOTEMAX) 0.5 % ophthalmic suspension; Place 1 drop into both eyes 2 (two) times daily as needed (for allergic symptoms).  Dispense: 5 mL; Refill: 1    Dry eyes, bilateral      Ocular health exam OU --defers DFE --OPTOS 4/2025 w/ normal findings   Updated specs rx gave copy, discussed options // fill prn --ok continue with current   Updated clrx, gave copy of last --had tried 4 different dailies // will order 1-2 others not yet tried   RD precautions given and reviewed. Patient knows to call/ message if any further changes in symptoms occur.  Seasonal allergy complaint --use minimally for bad days, not for chronic use   Low tear lake, may be compounded by current meds use   Increase ATs , monitor fans, vents   Discussed restasis / xiidra     Discussed possible lasik ---will have Dr Brody nurse contact for info     DAILY WEAR CONTACT LENSES  Continue with Daily Wear of contact lenses, up to all waking hours.  Continue with approved contact lens disinfection / rewetting drops as discussed.  Dispose of lenses daily.  Stop wearing your lenses and call our office if redness, blurred vision, or pain persists more than 12 hours.  We recommend an annual eye exam for contact lens patients.    Discussed and educated patient on current findings /plan.  RTC 1 year, prn if any changes / issues

## 2025-05-08 ENCOUNTER — LAB VISIT (OUTPATIENT)
Dept: LAB | Facility: HOSPITAL | Age: 35
End: 2025-05-08
Attending: OBSTETRICS & GYNECOLOGY
Payer: COMMERCIAL

## 2025-05-08 DIAGNOSIS — N95.1 SYMPTOMATIC MENOPAUSAL OR FEMALE CLIMACTERIC STATES: ICD-10-CM

## 2025-05-08 LAB
ESTRADIOL SERPL HS-MCNC: 63 PG/ML
FSH SERPL-ACNC: 3.53 MIU/ML
TESTOST SERPL-MCNC: 41 NG/DL (ref 5–73)

## 2025-05-08 PROCEDURE — 83001 ASSAY OF GONADOTROPIN (FSH): CPT

## 2025-05-08 PROCEDURE — 82670 ASSAY OF TOTAL ESTRADIOL: CPT

## 2025-05-08 PROCEDURE — 36415 COLL VENOUS BLD VENIPUNCTURE: CPT | Mod: PO

## 2025-05-08 PROCEDURE — 84403 ASSAY OF TOTAL TESTOSTERONE: CPT

## 2025-05-16 DIAGNOSIS — R11.2 NAUSEA AND VOMITING, UNSPECIFIED VOMITING TYPE: ICD-10-CM

## 2025-05-16 RX ORDER — ONDANSETRON 8 MG/1
8 TABLET, ORALLY DISINTEGRATING ORAL EVERY 6 HOURS PRN
Qty: 30 TABLET | Refills: 3 | Status: SHIPPED | OUTPATIENT
Start: 2025-05-16 | End: 2025-05-16

## 2025-05-16 RX ORDER — ONDANSETRON 8 MG/1
8 TABLET, ORALLY DISINTEGRATING ORAL EVERY 8 HOURS PRN
Qty: 30 TABLET | Refills: 3 | Status: SHIPPED | OUTPATIENT
Start: 2025-05-16

## 2025-06-10 ENCOUNTER — CLINICAL SUPPORT (OUTPATIENT)
Dept: PHYSICAL MEDICINE AND REHAB | Facility: CLINIC | Age: 35
End: 2025-06-10
Payer: COMMERCIAL

## 2025-06-10 DIAGNOSIS — G89.29: ICD-10-CM

## 2025-06-10 DIAGNOSIS — K13.79: ICD-10-CM

## 2025-06-10 DIAGNOSIS — M26.609: ICD-10-CM

## 2025-06-10 DIAGNOSIS — R51.9: ICD-10-CM

## 2025-06-10 DIAGNOSIS — G44.86 CERVICOGENIC HEADACHE: ICD-10-CM

## 2025-06-10 DIAGNOSIS — G24.3 CERVICAL DYSTONIA: Primary | ICD-10-CM

## 2025-06-10 PROCEDURE — 99214 OFFICE O/P EST MOD 30 MIN: CPT | Mod: S$GLB,,, | Performed by: PHYSICAL MEDICINE & REHABILITATION

## 2025-06-10 PROCEDURE — 99999 PR PBB SHADOW E&M-EST. PATIENT-LVL II: CPT | Mod: PBBFAC,,, | Performed by: PHYSICAL MEDICINE & REHABILITATION

## 2025-06-10 NOTE — PROGRESS NOTES
[facility]    CHIEF COMPLAINT:  1.   Chief Complaint   Patient presents with    Neck Pain     2. Dystonia management recommendations.    HISTORY OF PRESENT ILLNESS: Bertha Ochoa is a 35 y.o. female who presents to me for follow up evaluation and management of chronic neck and orofacial pain.     Today reports, worsening chronic neck and facial pain without noted traumatic event. 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 Also, bilateral orofacial pain over the lateral jaw below the ear. Notes popping sensation with eating and constant pain. No dental concerns.     Now with increased tension and pain in upper back and neck that travels to back of head with noted tension headaches daily.     Head positioning: right lateral lean    Medications: muscle relaxers without benefit, NSAIDs  Injections: suboccipital lidocaine blocks, trigger points without prolonged benefit or relief  Therapies: HEP, has trialed nightguards for TMD but did not tolerate due to pain.     Review of Systems   Constitutional: Negative for fever.   HENT: Negative for drooling.    Eyes: Negative for discharge.   Respiratory: Negative for choking.    Cardiovascular: Negative for chest pain.   Genitourinary: Negative for flank pain.   Skin: Negative for wound.   Allergic/Immunologic: Negative for immunocompromised state.   Neurological: Negative for tremors and syncope.   Psychiatric/Behavioral: Negative for behavioral problems.     Past Medical History:   Past Medical History:   Diagnosis Date    Abnormal uterine bleeding     Eczema     History of MRSA infection 01/2022    facial    PONV (postoperative nausea and vomiting)        Past Surgical History:   Past Surgical History:   Procedure Laterality Date    breast Bilateral 2010    reduction    BREAST SURGERY Bilateral 2006    augmentation    CHOLECYSTECTOMY      CYSTOSCOPY N/A 10/9/2024     Procedure: CYSTOSCOPY;  Surgeon: Rosalinda Dasilva MD;  Location: [facility] OR;  Service: OB/GYN;  Laterality: N/A;    DILATION AND CURETTAGE OF UTERUS N/A 09/02/2022    Procedure: DILATION AND CURETTAGE, UTERUS;  Surgeon: Rosalinda Dasilva MD;  Location: [facility] OR;  Service: OB/GYN;  Laterality: N/A;    ESOPHAGOGASTRODUODENOSCOPY N/A 6/20/2024    Procedure: EGD (ESOPHAGOGASTRODUODENOSCOPY);  Surgeon: Jaden Garcia MD;  Location: [facility] ENDO;  Service: Gastroenterology;  Laterality: N/A;    EXCISION OF BURSA OF HIP Bilateral 09/02/2022    Procedure: BURSECTOMY, HIP;  Surgeon: Stanford Thomas MD;  Location: [facility] OR;  Service: Orthopedics;  Laterality: Bilateral;    LAPAROSCOPIC CHOLECYSTECTOMY N/A 03/15/2024    Procedure: CHOLECYSTECTOMY-LAPAROSCOPIC;  Surgeon: Clint Nuñez MD;  Location: [facility] OR;  Service: General;  Laterality: N/A;    LAPAROSCOPIC LIGATION OF FALLOPIAN TUBE Bilateral 03/15/2024    Procedure: LIGATION, FALLOPIAN TUBE, LAPAROSCOPIC;  Surgeon: Rosalinda Dasilva MD;  Location: [facility] OR;  Service: OB/GYN;  Laterality: Bilateral;    LAPAROSCOPIC TOTAL HYSTERECTOMY N/A 10/9/2024    Procedure: HYSTERECTOMY, TOTAL, LAPAROSCOPIC;  Surgeon: Rosalinda Dasilva MD;  Location: [facility] OR;  Service: OB/GYN;  Laterality: N/A;    REPAIR OF VAGINAL CUFF N/A 12/12/2024    Procedure: REPAIR, VAGINAL CUFF;  Surgeon: Rosalinda Dasilva MD;  Location: [facility] OR;  Service: OB/GYN;  Laterality: N/A;    REPAIR, HERNIA, UMBILICAL N/A 03/15/2024    Procedure: REPAIR, HERNIA, UMBILICAL;  Surgeon: Clint Nuñez MD;  Location: [facility] OR;  Service: General;  Laterality: N/A;    THERMAL ABLATION OF ENDOMETRIUM USING HYSTEROSCOPY N/A 09/02/2022    Procedure: ABLATION, ENDOMETRIUM, THERMAL, HYSTEROSCOPIC;  Surgeon: Rosalinda Dasilva MD;  Location: [facility] OR;  Service: OB/GYN;  Laterality: N/A;  cavity width 3.0  cavity legnth 6.0  power 99w  time 0:30sec       Family History:   Family History   Problem Relation Name Age of Onset     Hyperlipidemia Father      Hypertension Father      Cancer Maternal Grandmother      Cancer Paternal Grandfather      Glaucoma Neg Hx      Cataracts Neg Hx      Macular degeneration Neg Hx      Retinal detachment Neg Hx      Amblyopia Neg Hx      Blindness Neg Hx      Strabismus Neg Hx         Medications:   Current Outpatient Medications on File Prior to Visit   Medication Sig Dispense Refill    ALPRAZolam (XANAX) 0.5 MG tablet Take 0.5 mg by mouth.      ammonium lactate (LAC-HYDRIN) 12 % lotion APPLY TO RASH twice daily as directed 225 g 11    buPROPion (WELLBUTRIN XL) 150 MG TB24 tablet Take 1 tablet by mouth once daily. (Patient not taking: Reported on 12/12/2024) 90 tablet 0    ELDERBERRY FRUIT ORAL Take 1 capsule by mouth Daily.      eszopiclone (LUNESTA) 2 MG Tab Take 1 tablet (2 mg total) by mouth nightly. 30 tablet 3    hydrocortisone 2.5 % ointment Apply topically 2 (two) times daily. (Patient not taking: Reported on 4/10/2025) 28.35 g 0    ibuprofen (ADVIL,MOTRIN) 800 MG tablet Take 1 tablet (800 mg total) by mouth every 8 (eight) hours as needed for Pain. (Patient not taking: Reported on 4/10/2025) 60 tablet 2    ibuprofen (ADVIL,MOTRIN) 800 MG tablet Take 1 tablet (800 mg total) by mouth every 8 (eight) hours as needed for Pain. 60 tablet 2    ketoconazole (NIZORAL) 2 % cream Apply 1 application on the skin as directed; APPLY THIN LAYER TWICE A DAY TO AFFECTED AREAS OF SKIN PRN 30 g 2    loteprednol (LOTEMAX) 0.5 % ophthalmic suspension Place 1 drop into both eyes 2 (two) times daily as needed (for allergic symptoms). 5 mL 1    methocarbamoL (ROBAXIN) 750 MG Tab Take 1 tablet (750 mg total) by mouth 4 (four) times daily as needed. (Patient not taking: Reported on 4/10/2025) 44 tablet 3    mirabegron (MYRBETRIQ) 25 mg Tb24 ER tablet Take 1 tablet (25 mg total) by mouth once daily. (Patient not taking: Reported on 4/10/2025) 30 tablet 2    ondansetron (ZOFRAN-ODT) 8 MG TbDL Take 1 tablet (8 mg total) by  mouth every 8 (eight) hours as needed (Nausea and/or vomiting). 30 tablet 3     Current Facility-Administered Medications on File Prior to Visit   Medication Dose Route Frequency Provider Last Rate Last Admin    HYDROmorphone injection 0.5 mg  0.5 mg Intravenous Q5 Min PRN Yamile Dewitt MD   0.5 mg at 03/15/24 0924    lactated ringers infusion   Intravenous Continuous Harry Chen  mL/hr at 10/09/24 0712 New Bag at 10/09/24 0746    oxyCODONE-acetaminophen 5-325 mg per tablet 1 tablet  1 tablet Oral Q3H PRN Yamile Dewitt MD           Allergies: Review of patient's allergies indicates:  No Known Allergies    Social History:   Social History     Socioeconomic History    Marital status:    Tobacco Use    Smoking status: Never     Passive exposure: Never    Smokeless tobacco: Never   Substance and Sexual Activity    Alcohol use: No    Drug use: No    Sexual activity: Yes     Social Drivers of Health     Financial Resource Strain: Low Risk  (5/20/2024)    Overall Financial Resource Strain (CARDIA)     Difficulty of Paying Living Expenses: Not hard at all   Food Insecurity: No Food Insecurity (5/20/2024)    Hunger Vital Sign     Worried About Running Out of Food in the Last Year: Never true     Ran Out of Food in the Last Year: Never true   Physical Activity: Insufficiently Active (5/20/2024)    Exercise Vital Sign     Days of Exercise per Week: 2 days     Minutes of Exercise per Session: 30 min   Stress: Stress Concern Present (5/20/2024)    [employer] - Occupational Stress Questionnaire     Feeling of Stress : To some extent   Housing Stability: Unknown (5/20/2024)    Housing Stability Vital Sign     Unable to Pay for Housing in the Last Year: No       PHYSICAL EXAMINATION:   There were no vitals filed for this visit.  Constitutional: No apparent distress. Pleasant.  HENT: Trachea midline.  Head: Normocephalic and atraumatic. Head and neck revealed right  lateral flexion with anterocollis and extension caput  Eyes: Right eye exhibits no discharge. Left eye exhibits no discharge. No scleral icterus.   CV: Well perfused.   Pulmonary/Chest: Effort normal. No respiratory distress.   Abdominal: There is no guarding.   Neurological: Awake, alert and cooperative.  SKIN: Intact no apparent lesions, cut, ulcers or abrasions  EXT:  No cyanosis, clubbing, or edema.  SENSORY: Intact to light touch in the bilateral upper extemities.  MUSCULOSKELETAL:   Muscle Strength:(0-5)                              Right     Left  Shoulder abduction:               5  5  Shoulder external rotation:     5  5  Elbow flexion:              5  5  Elbow extension:          5  5  Wrist extension:             5  5  Wrist flexion:             5  5  Finger extension:        5  5  Finger flexion:   5  5  Finger abduction:  5  5    Reflexes: 0-4+/4   Right     Left  Biceps (C5):  2+  2+  Brachioradialis (C6): 2+  2+  Triceps (C7):  2+  2+    INSPECTION:         Right     Left   Localized/Generalized swelling:   -  -  Muscle contours normal and symmetrical: +  +  Ecchymoses:      -  -  Erythema:      -  -  Gross deformity:    -  -    RANGE OF MOTION:           Right      Left  Flexion:  Full  Full   Extension:  Full  Full  Rotation:   Full  Full  Lateral side bending: Full  Full  Other:     TENDERNESS:                 Right      Left  Spinous process:       -  -  Cervical paraspinals:  +  +  Trapezius:   +  +  Sub-occipital region:  +  +  Other: bilateral proximal SCMs at insertion of mastoid; bilateral levator scapular over supermedial border of scapula; bilateral masseters; pain over bilateral TMJs with right sided palpable clicking with PROM of jaw    SOFT TISSUE PALPATION:      Right  Left   Effusion:  -  -  Other:        SPECIAL TESTS:         Right     Left  Spurling's:  -  -  Wrist clonus:  -  -  Krishna's:  -  -    IMAGING:  CT maxillofacial 12/22/2021 with noted:   1. There appears to be  asymmetrical subcutaneous strandy soft tissue edema about the left facial soft tissues. There is associated left facial skin thickening. There appears to be a focal area of intermediate soft tissue attenuation measuring approximately 8 mm at the epicenter of soft tissue edema with overlying convex skin/soft tissue borders. This is nonspecific, possibly related to infectious/inflammatory process with focal area of phlegmonous changes in the area of intermediate soft tissue attenuation. No obvious rim enhancing and drainable fluid collections are appreciated at this time.     Data Reviewed: CT  Supportive Actions: Independent visualization of images or test specimens.    ASSESSMENT:   1. Cervical dystonia    2. 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 Cervicogenic headache        PLAN:   1. Time was spent reviewing the above diagnosis in depth with Bertha today, including acute management and rehabilitation.     2. Dystonia:  - less than ideal response from oral muscle relaxers  - origin of neck and facial pain is due to underlying myofascial pain with dystonia, recommend use of focal neurotoxin injections for management with 300 units Botox to the following muscles:   Left sternocleidomastoid 10 units  Right sternocleidomastoid 10 units  Left splenius capitus  15 units  Right splenius capitus  15 units  Left trapezius   30 units  Right trapezius  30 units  Left levator scapula  20 units  Right levator scapula  20 units  Left masseter   30 units (superficial and deep belly)  Right masseter  30 units (superficial and deep belly)  - expect to repeat these every 3 months.     RTC for planned Botox injections.     30 minutes of total time spent on the encounter, which includes face to face time and non-face to face time preparing to see the patient (eg, review of tests), obtaining and/or reviewing separately obtained history, documenting clinical information in the electronic or other health  record, independently interpreting results (not separately reported), communicating results to the patient/family/caregiver, and/or care coordination (not separately reported).

## 2025-06-17 DIAGNOSIS — R11.2 NAUSEA AND VOMITING, UNSPECIFIED VOMITING TYPE: ICD-10-CM

## 2025-06-17 RX ORDER — ONDANSETRON 8 MG/1
8 TABLET, ORALLY DISINTEGRATING ORAL EVERY 8 HOURS PRN
Qty: 30 TABLET | Refills: 3 | Status: SHIPPED | OUTPATIENT
Start: 2025-06-17

## 2025-07-08 ENCOUNTER — CLINICAL SUPPORT (OUTPATIENT)
Dept: PHYSICAL MEDICINE AND REHAB | Facility: CLINIC | Age: 35
End: 2025-07-08
Payer: COMMERCIAL

## 2025-07-08 VITALS — DIASTOLIC BLOOD PRESSURE: 56 MMHG | HEART RATE: 71 BPM | SYSTOLIC BLOOD PRESSURE: 110 MMHG

## 2025-07-08 DIAGNOSIS — K13.79: ICD-10-CM

## 2025-07-08 DIAGNOSIS — G44.86 CERVICOGENIC HEADACHE: ICD-10-CM

## 2025-07-08 DIAGNOSIS — G89.29: ICD-10-CM

## 2025-07-08 DIAGNOSIS — R51.9: ICD-10-CM

## 2025-07-08 DIAGNOSIS — M26.609: ICD-10-CM

## 2025-07-08 DIAGNOSIS — G24.3 CERVICAL DYSTONIA: Primary | ICD-10-CM

## 2025-07-08 PROCEDURE — 99999 PR PBB SHADOW E&M-EST. PATIENT-LVL III: CPT | Mod: PBBFAC,,, | Performed by: PHYSICAL MEDICINE & REHABILITATION

## 2025-07-08 NOTE — PROCEDURES
Botulinum Injection  Location: Neck    Date/Time: 7/8/2025 2:00 PM    Performed by: Caroline Burr DO  Authorized by: Caroline Burr DO      Consent:      Consent obtained:  Written     Consent given by:  Patient     Risks discussed:  Bleeding, infection, dysphagia, pain and weakness     Alternatives discussed:  No treatment    Universal protocol:      Site/side verified:  Yes       Immediately prior to procedure a time out was called:  Yes       Patient identity confirmed:  Verbally with patient  Procedure details:      EMG used?:  Yes     Diluted by:  Preservative free saline     Laterality: Bilateral     Toxin (Brand):  OnaBoNT-A (Botox)     Total number of units available:  300       Right splenius capitis:  15 units divided amongst site(s)     Left splenius capitis:  15 units divided amongst site(s)     Right sternocleidomastoid:  10 units divided amongst site(s)     Left sternocleidomastoid:  10 units divided amongst site(s)     Right upper trapezius:  15 units divided amongst site(s)     Left upper trapezius:  15 units divided amongst site(s)     Right horizontal trapezius:  15 units divided amongst site(s)     Left horizontal trapezius:  15 units divided amongst site(s)     Right levator scapulae:  20 units divided amongst site(s)     Left levator scapulae:  20 units divided amongst site(s)       Ad hoc region injected:  Left masseter 30 units (superficial and deep belly); Right masseter 30 units (superficial and deep belly) with 60 units     Total units injected:  210     Total units wasted:  90    Medications: 300 Units onabotulinumtoxina 100 unit    Post-procedure details:      Patient tolerance of procedure:  Tolerated well, no immediate complications

## 2025-07-08 NOTE — PROGRESS NOTES
[facility] PROCEDURE NOTE    CHIEF COMPLAINT:   Chief Complaint   Patient presents with    Shoulder Pain     Both Traps        HISTORY OF PRESENT ILLNESS: Bertha Ochoa is a 35 y.o. female who presents to me for planned procedure/injection of focal neurotoxin for management of the below noted diagnosis.     Medications: Medications Ordered Prior to Encounter[1]    Allergies: Review of patient's allergies indicates:  No Known Allergies    Vitals:   Vitals:    07/08/25 1415   BP: (!) 110/56   Pulse: 71       ASSESSMENT:   1. Cervical dystonia    2. 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 Cervicogenic headache        PLAN:   1. Procedure/injection was completed for management of above diagnosis.    2. Please see procedure note from today's visit with further details.     Left sternocleidomastoid 10 units  Right sternocleidomastoid 10 units  Left splenius capitus  15 units  Right splenius capitus  15 units  Left trapezius   30 units  Right trapezius  30 units  Left levator scapula  20 units  Right levator scapula  20 units  Left masseter   30 units (superficial and deep belly)  Right masseter  30 units (superficial and deep belly)    RTC as needed. Okay to repeat Botox every 3 months, will require 300 units.          [1]   Current Outpatient Medications on File Prior to Visit   Medication Sig Dispense Refill    ALPRAZolam (XANAX) 0.5 MG tablet Take 0.5 mg by mouth.      ammonium lactate (LAC-HYDRIN) 12 % lotion APPLY TO RASH twice daily as directed 225 g 11    buPROPion (WELLBUTRIN XL) 150 MG TB24 tablet Take 1 tablet by mouth once daily. (Patient not taking: Reported on 12/12/2024) 90 tablet 0    ELDERBERRY FRUIT ORAL Take 1 capsule by mouth Daily.      eszopiclone (LUNESTA) 2 MG Tab Take 1 tablet (2 mg total) by mouth nightly. 30 tablet 3    hydrocortisone 2.5 % ointment Apply topically 2 (two) times daily. (Patient not taking:  Reported on 4/10/2025) 28.35 g 0    ibuprofen (ADVIL,MOTRIN) 800 MG tablet Take 1 tablet (800 mg total) by mouth every 8 (eight) hours as needed for Pain. (Patient not taking: Reported on 4/10/2025) 60 tablet 2    ibuprofen (ADVIL,MOTRIN) 800 MG tablet Take 1 tablet (800 mg total) by mouth every 8 (eight) hours as needed for Pain. 60 tablet 2    ketoconazole (NIZORAL) 2 % cream Apply 1 application on the skin as directed; APPLY THIN LAYER TWICE A DAY TO AFFECTED AREAS OF SKIN PRN 30 g 2    loteprednol (LOTEMAX) 0.5 % ophthalmic suspension Place 1 drop into both eyes 2 (two) times daily as needed (for allergic symptoms). 5 mL 1    methocarbamoL (ROBAXIN) 750 MG Tab Take 1 tablet (750 mg total) by mouth 4 (four) times daily as needed. (Patient not taking: Reported on 4/10/2025) 44 tablet 3    mirabegron (MYRBETRIQ) 25 mg Tb24 ER tablet Take 1 tablet (25 mg total) by mouth once daily. (Patient not taking: Reported on 4/10/2025) 30 tablet 2    ondansetron (ZOFRAN-ODT) 8 MG TbDL Take 1 tablet (8 mg total) by mouth every 8 (eight) hours as needed (Nausea and/or vomiting). 30 tablet 3     Current Facility-Administered Medications on File Prior to Visit   Medication Dose Route Frequency Provider Last Rate Last Admin    HYDROmorphone injection 0.5 mg  0.5 mg Intravenous Q5 Min PRN Yamile Dewitt MD   0.5 mg at 03/15/24 0924    lactated ringers infusion   Intravenous Continuous Harry Chen  mL/hr at 10/09/24 0712 New Bag at 10/09/24 0746    oxyCODONE-acetaminophen 5-325 mg per tablet 1 tablet  1 tablet Oral Q3H PRN Ymaile Dewitt MD

## 2025-08-19 DIAGNOSIS — T63.441A BEE STING REACTION, ACCIDENTAL OR UNINTENTIONAL, INITIAL ENCOUNTER: ICD-10-CM

## 2025-08-19 RX ORDER — HYDROCORTISONE 25 MG/G
OINTMENT TOPICAL 2 TIMES DAILY
Qty: 28.35 G | Refills: 0 | Status: CANCELLED | OUTPATIENT
Start: 2025-08-19

## 2025-08-20 RX ORDER — HYDROCORTISONE 25 MG/G
OINTMENT TOPICAL 2 TIMES DAILY
Qty: 28.35 G | Refills: 0 | Status: SHIPPED | OUTPATIENT
Start: 2025-08-20